# Patient Record
Sex: MALE | Race: BLACK OR AFRICAN AMERICAN | NOT HISPANIC OR LATINO | URBAN - METROPOLITAN AREA
[De-identification: names, ages, dates, MRNs, and addresses within clinical notes are randomized per-mention and may not be internally consistent; named-entity substitution may affect disease eponyms.]

---

## 2017-07-19 ENCOUNTER — EMERGENCY (EMERGENCY)
Facility: HOSPITAL | Age: 47
LOS: 1 days | Discharge: HOME | End: 2017-07-19
Attending: PERSONAL EMERGENCY RESPONSE ATTENDANT | Admitting: PERSONAL EMERGENCY RESPONSE ATTENDANT
Payer: COMMERCIAL

## 2017-07-19 VITALS
SYSTOLIC BLOOD PRESSURE: 96 MMHG | DIASTOLIC BLOOD PRESSURE: 59 MMHG | TEMPERATURE: 98 F | RESPIRATION RATE: 16 BRPM | OXYGEN SATURATION: 98 % | HEART RATE: 46 BPM

## 2017-07-19 LAB
ALBUMIN SERPL ELPH-MCNC: 4.1 G/DL — SIGNIFICANT CHANGE UP (ref 3.3–5)
ALP SERPL-CCNC: 49 U/L — SIGNIFICANT CHANGE UP (ref 40–120)
ALT FLD-CCNC: 17 U/L RC — SIGNIFICANT CHANGE UP (ref 10–45)
ANION GAP SERPL CALC-SCNC: 12 MMOL/L — SIGNIFICANT CHANGE UP (ref 5–17)
APTT BLD: 23.5 SEC — LOW (ref 27.5–37.4)
AST SERPL-CCNC: 23 U/L — SIGNIFICANT CHANGE UP (ref 10–40)
BASOPHILS # BLD AUTO: 0 K/UL — SIGNIFICANT CHANGE UP (ref 0–0.2)
BASOPHILS NFR BLD AUTO: 0.2 % — SIGNIFICANT CHANGE UP (ref 0–2)
BILIRUB SERPL-MCNC: 0.4 MG/DL — SIGNIFICANT CHANGE UP (ref 0.2–1.2)
BUN SERPL-MCNC: 19 MG/DL — SIGNIFICANT CHANGE UP (ref 7–23)
CALCIUM SERPL-MCNC: 9.4 MG/DL — SIGNIFICANT CHANGE UP (ref 8.4–10.5)
CHLORIDE SERPL-SCNC: 101 MMOL/L — SIGNIFICANT CHANGE UP (ref 96–108)
CO2 SERPL-SCNC: 27 MMOL/L — SIGNIFICANT CHANGE UP (ref 22–31)
CREAT SERPL-MCNC: 1.17 MG/DL — SIGNIFICANT CHANGE UP (ref 0.5–1.3)
EOSINOPHIL # BLD AUTO: 0.2 K/UL — SIGNIFICANT CHANGE UP (ref 0–0.5)
EOSINOPHIL NFR BLD AUTO: 1.8 % — SIGNIFICANT CHANGE UP (ref 0–6)
GAS PNL BLDV: SIGNIFICANT CHANGE UP
GLUCOSE SERPL-MCNC: 106 MG/DL — HIGH (ref 70–99)
HCT VFR BLD CALC: 44.2 % — SIGNIFICANT CHANGE UP (ref 39–50)
HGB BLD-MCNC: 14.4 G/DL — SIGNIFICANT CHANGE UP (ref 13–17)
HIV 1 & 2 AB SERPL IA.RAPID: SIGNIFICANT CHANGE UP
INR BLD: 1.14 RATIO — SIGNIFICANT CHANGE UP (ref 0.88–1.16)
LYMPHOCYTES # BLD AUTO: 1.8 K/UL — SIGNIFICANT CHANGE UP (ref 1–3.3)
LYMPHOCYTES # BLD AUTO: 14.8 % — SIGNIFICANT CHANGE UP (ref 13–44)
MCHC RBC-ENTMCNC: 28.2 PG — SIGNIFICANT CHANGE UP (ref 27–34)
MCHC RBC-ENTMCNC: 32.7 GM/DL — SIGNIFICANT CHANGE UP (ref 32–36)
MCV RBC AUTO: 86.3 FL — SIGNIFICANT CHANGE UP (ref 80–100)
MONOCYTES # BLD AUTO: 0.9 K/UL — SIGNIFICANT CHANGE UP (ref 0–0.9)
MONOCYTES NFR BLD AUTO: 7.5 % — SIGNIFICANT CHANGE UP (ref 2–14)
NEUTROPHILS # BLD AUTO: 9 K/UL — HIGH (ref 1.8–7.4)
NEUTROPHILS NFR BLD AUTO: 75.7 % — SIGNIFICANT CHANGE UP (ref 43–77)
PLATELET # BLD AUTO: 224 K/UL — SIGNIFICANT CHANGE UP (ref 150–400)
POTASSIUM SERPL-MCNC: 4.4 MMOL/L — SIGNIFICANT CHANGE UP (ref 3.5–5.3)
POTASSIUM SERPL-SCNC: 4.4 MMOL/L — SIGNIFICANT CHANGE UP (ref 3.5–5.3)
PROT SERPL-MCNC: 7.2 G/DL — SIGNIFICANT CHANGE UP (ref 6–8.3)
PROTHROM AB SERPL-ACNC: 12.3 SEC — SIGNIFICANT CHANGE UP (ref 9.8–12.7)
RBC # BLD: 5.12 M/UL — SIGNIFICANT CHANGE UP (ref 4.2–5.8)
RBC # FLD: 12.8 % — SIGNIFICANT CHANGE UP (ref 10.3–14.5)
SODIUM SERPL-SCNC: 140 MMOL/L — SIGNIFICANT CHANGE UP (ref 135–145)
WBC # BLD: 11.9 K/UL — HIGH (ref 3.8–10.5)
WBC # FLD AUTO: 11.9 K/UL — HIGH (ref 3.8–10.5)

## 2017-07-19 PROCEDURE — 99220: CPT | Mod: 25

## 2017-07-19 PROCEDURE — 93010 ELECTROCARDIOGRAM REPORT: CPT

## 2017-07-19 RX ORDER — DIPHENHYDRAMINE HCL 50 MG
50 CAPSULE ORAL ONCE
Qty: 0 | Refills: 0 | Status: DISCONTINUED | OUTPATIENT
Start: 2017-07-19 | End: 2017-07-23

## 2017-07-19 RX ORDER — SODIUM CHLORIDE 9 MG/ML
1000 INJECTION INTRAMUSCULAR; INTRAVENOUS; SUBCUTANEOUS ONCE
Qty: 0 | Refills: 0 | Status: COMPLETED | OUTPATIENT
Start: 2017-07-19 | End: 2017-07-19

## 2017-07-19 RX ORDER — DIPHENHYDRAMINE HCL 50 MG
50 CAPSULE ORAL EVERY 4 HOURS
Qty: 0 | Refills: 0 | Status: DISCONTINUED | OUTPATIENT
Start: 2017-07-19 | End: 2017-07-19

## 2017-07-19 RX ORDER — SODIUM CHLORIDE 9 MG/ML
3 INJECTION INTRAMUSCULAR; INTRAVENOUS; SUBCUTANEOUS EVERY 8 HOURS
Qty: 0 | Refills: 0 | Status: DISCONTINUED | OUTPATIENT
Start: 2017-07-19 | End: 2017-07-23

## 2017-07-19 RX ADMIN — SODIUM CHLORIDE 3 MILLILITER(S): 9 INJECTION INTRAMUSCULAR; INTRAVENOUS; SUBCUTANEOUS at 21:24

## 2017-07-19 RX ADMIN — SODIUM CHLORIDE 1000 MILLILITER(S): 9 INJECTION INTRAMUSCULAR; INTRAVENOUS; SUBCUTANEOUS at 18:14

## 2017-07-19 NOTE — ED PROVIDER NOTE - OBJECTIVE STATEMENT
48 yo m with no pmh presents after 2 syncopal episodes, lasting a few seconds each at work while inside. Patient reports having symptoms of sweatiness, nausea prior. Denies chest pain or shortness of breath.  He went to the toilet to try and defecate and passed out again on the toilet. Now he complains of generalized weakness. Denies other symptoms at present time.

## 2017-07-19 NOTE — ED ADULT NURSE NOTE - CHPI ED SYMPTOMS NEG
no blurred vision/no fever/no confusion/no nausea/no vomiting/no dizziness/no change in level of consciousness/no numbness

## 2017-07-19 NOTE — ED CDU PROVIDER NOTE - MEDICAL DECISION MAKING DETAILS
Attending MD Vo.  Agree with above.  PT is a 47 yr old male presenting to ED with complaint of  syncope while defecating.  He states that he was having a BM and became hot/sweaty and syncopized.  Pt came in by EMS.  Pt denies falls/head injuries.  Currently asymptomatic, ambulating around ED with stready gait.  No ACS risk factors. Attending MD Vo.  Agree with above.  PT is a 47 yr old male presenting to ED with complaint of transient near-syncope while walking out of an office then states that he was having a BM and became hot/sweaty and syncopized. Pt came in by EMS.  Pt denies falls/head injuries.  Currently asymptomatic, ambulating around ED with stready gait.  No ACS risk factors.  No hx of IVDU, no hx of cardiac events.  Pt endorses generalized weakness/malaise since yesterday.  Had thoracic back pain transiently yesterday and it resolved.  Pt endorses bilateral hand tingling.  Pt has no lesions/evidence of distal emboli.  Pt is currently afebrile.  Denies CP.  EKG with diffuse ST elevation and bradycardia.  No known exposures.  No cough/congestion.  PT is A & O x 3 in ED.  No reported SOB.  Pt endorses frequent exercise and HR may be normal for him.  Planned ACS eval, echo, trop, stress given recurrent episodes of syncope in setting of generalized malaise.

## 2017-07-19 NOTE — ED CDU PROVIDER NOTE - PROGRESS NOTE DETAILS
AR[employer]: Discussion with pt re: concern for pericarditis vs. endocarditis vs. myocarditis and desire to admit for provocative testing/echo.  Pt comfortable with this plan.  Pt noted now on CDU eval to have wheals c/w hives several over face and several over chest.  No medications have been dosed at this time.  Denies known allergies.  No n/v/clear breath sounds at this time.  Pt remains A & O x 3 in no acute distress.  No hx of cardiac work-up previously however pt endorses remote hx of syncopal event without exertion/CP but states that at that time he became diaphoretic and warm and syncopized.  Possible vagal however warrants full ACS eval.  Will dose benadryl for unclear etiology of apparent hives.  No routine meds, no focal to intertrigonal spaces, inconsistent with bed bugs/scabies.  No focus to areas of adhesive tape. CDU NOTE DWAYNE MARCELINO: Pt resting comfortably, feeling well without complaint. didn't want benadryl as rash is already resolving and pt is not itching. NAD, VSS. No events on telemetry. CDU NOTE DWAYNE MARCELINO: Pt resting comfortably, feeling well without complaint. NAD, VSS. No events on telemetry. rash completely resolved. Patient taken to stress lab. - Saritha Keys PA-C Patient with normal TTE results. Back from stress lab. Resting comfortably in CDU. NAD. No complaints. VSS. No tele events. Awaiting stress test results. - Saritha Keys PA-C Spoke with Dr. Crump in stress lab regarding pt's stress test results - Pt with excellent METS for age, and EKG changes that do not meet ischemic criteria. Discussed results with patient. Reports he plans to f/u with his PMD after discharge today. Pt stable for d/c. D/w Dr. Marin. - Saritha Keys, PA-C Patient with normal TTE results. Back from stress lab. Resting comfortably in CDU. NAD. No complaints. Denies CP, SOB, palpitations. VSS. No tele events. Awaiting stress test results. - Saritha Keys PA-C 47 yr old male presenting to ED with complaint of transient near-syncope.  No CP.  Per stress test results, do not meet criteria for ischemia; echo wnl; stable for dc.  On my assessment, patient is well appearing in NAD, cardiac: nrl s1/s2, lungs: ctabl, no peripheral edema, no jvd; abd soft nt/nd.  Stable for dc with outpatient f/u. -Tomas I have personally performed a face to face diagnostic evaluation on this patient.  I have reviewed the ACP note and agree with the history, exam, and plan of care, except as noted.  History and Exam by me shows: 47 yr old male presenting to ED with complaint of transient near-syncope.  No CP.  Per stress test results, do not meet criteria for ischemia; echo wnl; stable for dc.  On my assessment, patient is well appearing in NAD, cardiac: nrl s1/s2, lungs: ctabl, no peripheral edema, no jvd; abd soft nt/nd.  Stable for dc with outpatient f/u. -Tomas

## 2017-07-19 NOTE — ED ADULT NURSE REASSESSMENT NOTE - NS ED NURSE REASSESS COMMENT FT1
blood cultures drawn and sent as per MD orders  DWAYNE Bernstein from CDU at pts bedside evaluating pt for CDU acceptance  pt comfortable   plan of care explained

## 2017-07-19 NOTE — ED ADULT NURSE NOTE - ED STAT RN HANDOFF DETAILS
Report given to Cata PARK, to be transported to CDU on telemetry box as per request of CDU RN Report given to Cata RN, Pt on telemetry box, working appropriately

## 2017-07-19 NOTE — ED ADULT NURSE NOTE - OBJECTIVE STATEMENT
47 yr old male with no medical history present to the ER for  syncope. Pt reports that he was at work and was trying to pass stool when he passed out. As per pt he passed out twice. Pt also states he was also experiencing nausea, however denies vomiting. pt does not know how long he passed out for.

## 2017-07-19 NOTE — ED CDU PROVIDER NOTE - PLAN OF CARE
1. Drink plenty of fluids to stay hydrated.  2. Continue your home medications as directed.  3. You will need to follow-up with your PMD in the next 2-3 days for your (near) fainting spell. A copy of your results were given to you to bring to your appointment.  4. Return to ER for lightheaded/dizziness, chest pain, trouble breathing, palpitations, or any other concerns.

## 2017-07-19 NOTE — ED ADULT NURSE REASSESSMENT NOTE - NS ED NURSE REASSESS COMMENT FT1
Report given to Cata PARK in CDU, pt to be sent on telemetry box as per request of CDU RN Report given to Cata RN in CDU, pt sent with telemetry box on and working appropriately

## 2017-07-19 NOTE — ED PROVIDER NOTE - ATTENDING CONTRIBUTION TO CARE
Attending MD Vo.  Agree with above.  PT is a 47 yr old male presenting to ED with complaint of transient near-syncope while walking out of an office then states that he was having a BM and became hot/sweaty and syncopized. Pt came in by EMS.  Pt denies falls/head injuries.  Currently asymptomatic, ambulating around ED with stready gait.  No ACS risk factors.  No hx of IVDU, no hx of cardiac events.  Pt endorses generalized weakness/malaise since yesterday.  Had thoracic back pain transiently yesterday and it resolved.  Pt endorses bilateral hand tingling.  Pt has no lesions/evidence of distal emboli.  Pt is currently afebrile.  Denies CP.  EKG with diffuse ST elevation and bradycardia.  No known exposures.  No cough/congestion.  PT is A & O x 3 in ED.  No reported SOB.  Pt endorses frequent exercise and HR may be normal for him.  Planned ACS eval, echo, trop, stress given recurrent episodes of syncope in setting of generalized malaise.

## 2017-07-19 NOTE — ED ADULT NURSE REASSESSMENT NOTE - NS ED NURSE REASSESS COMMENT FT1
Report received from Daron Angel RN, resting upright in stretcher, red welts noted on bilat cheeks and abd. Pt states he just ate "pretzels and karen dune cookies," and that "the doctor knows, she was just here." Pt denied difficulty breathing, SOB/chest pain, difficulty swallowing, and cough, does c/o feeling itchy. Will continue to monitor.

## 2017-07-19 NOTE — ED ADULT NURSE REASSESSMENT NOTE - NS ED NURSE REASSESS COMMENT FT1
Received pt from XAVIER Rojas, received pt alert and responsive, oriented x4, denies any respiratory distress, SOB, or difficulty breathing. Pt transferred to CDU for observation for syncope, neuro intact, pt denies CP, SOB, diaphoresis, N/V, F/C, denies heart palpitations, visual changes. Pt c/o mild lightheadedness at this time.  Pt pending 2nd CE with EKG, stress test/ ECHO pending, tele monitoring SB on monitor HR: 50's.  IV in place, patent and free of signs of infiltration, V/S stable, pt afebrile, pt denies pain at this time. Pt educated on unit and unit rules, instructed patient to notify RN of any needed assistance, Pt verbalizes understanding, Call bell placed within reach. Safety maintained. Will continue to monitor. Received pt from XAVIER Rojas, received pt alert and responsive, oriented x4, denies any respiratory distress, SOB, or difficulty breathing. Pt transferred to CDU for observation for syncope, neuro intact, pt denies CP, SOB, diaphoresis, N/V, F/C, denies heart palpitations, visual changes. Pt c/o mild lightheadedness at this time.  Pt pending 2nd CE with EKG, stress test/ ECHO pending, tele monitoring SB on monitor HR: 50's. Mild rash noted to bilateral cheeks and chest area, pt denies any respiratory issues. Pt states redness is " going down".   IV in place, patent and free of signs of infiltration, V/S stable, pt afebrile, pt denies pain at this time. Pt educated on unit and unit rules, instructed patient to notify RN of any needed assistance, Pt verbalizes understanding, Call bell placed within reach. Safety maintained. Will continue to monitor.

## 2017-07-19 NOTE — ED CDU PROVIDER NOTE - DETAILS
Syncope  - frequent re-eval  - vitals q4hrs,  - tele, CE x 2 with EKG, TTE, stress test  - discussed with attending Dr. Vo

## 2017-07-19 NOTE — ED CDU PROVIDER NOTE - OBJECTIVE STATEMENT
46y/o M with no PMH presents after 2 syncopal episodes today lasting a few seconds each at work while inside. Patient reports having symptoms of nausea, sweats, weakness prior to 1st episode; states he was walking when it happened. The 2nd episode he went to the toilet to try and defecate and passed out again on the toilet. Neither episode was witnessed, so pt not sure how long he was out for but doesn't think it was long. Now he complains of generalized weakness. Denies other symptoms at present time. Denies chest pain or shortness of breath. Denied any confusion upon regaining consciousness, denies head injury, H/A, or any pain anywhere. Pt states he synopsized once before a couple years ago, states he was resting, then had nausea, sweats, and passed out. Noticed rash while talking to patient, patient denies having rash before. denies pruritis.

## 2017-07-20 VITALS
HEART RATE: 65 BPM | OXYGEN SATURATION: 99 % | SYSTOLIC BLOOD PRESSURE: 98 MMHG | TEMPERATURE: 98 F | RESPIRATION RATE: 18 BRPM | DIASTOLIC BLOOD PRESSURE: 50 MMHG

## 2017-07-20 LAB
CHOLEST SERPL-MCNC: 98 MG/DL — SIGNIFICANT CHANGE UP (ref 10–199)
HBA1C BLD-MCNC: 5.6 % — SIGNIFICANT CHANGE UP (ref 4–5.6)
HDLC SERPL-MCNC: 54 MG/DL — SIGNIFICANT CHANGE UP (ref 40–125)
LIPID PNL WITH DIRECT LDL SERPL: 34 MG/DL — SIGNIFICANT CHANGE UP
TOTAL CHOLESTEROL/HDL RATIO MEASUREMENT: 1.8 RATIO — LOW (ref 3.4–9.6)
TRIGL SERPL-MCNC: 49 MG/DL — SIGNIFICANT CHANGE UP (ref 10–149)
TROPONIN T SERPL-MCNC: <0.01 NG/ML — SIGNIFICANT CHANGE UP (ref 0–0.06)

## 2017-07-20 PROCEDURE — 84484 ASSAY OF TROPONIN QUANT: CPT

## 2017-07-20 PROCEDURE — 93016 CV STRESS TEST SUPVJ ONLY: CPT

## 2017-07-20 PROCEDURE — G0378: CPT

## 2017-07-20 PROCEDURE — 82947 ASSAY GLUCOSE BLOOD QUANT: CPT

## 2017-07-20 PROCEDURE — 82962 GLUCOSE BLOOD TEST: CPT

## 2017-07-20 PROCEDURE — 83605 ASSAY OF LACTIC ACID: CPT

## 2017-07-20 PROCEDURE — 80061 LIPID PANEL: CPT

## 2017-07-20 PROCEDURE — 82435 ASSAY OF BLOOD CHLORIDE: CPT

## 2017-07-20 PROCEDURE — 93017 CV STRESS TEST TRACING ONLY: CPT

## 2017-07-20 PROCEDURE — 99284 EMERGENCY DEPT VISIT MOD MDM: CPT | Mod: 25

## 2017-07-20 PROCEDURE — 82803 BLOOD GASES ANY COMBINATION: CPT

## 2017-07-20 PROCEDURE — 85730 THROMBOPLASTIN TIME PARTIAL: CPT

## 2017-07-20 PROCEDURE — 85610 PROTHROMBIN TIME: CPT

## 2017-07-20 PROCEDURE — 93010 ELECTROCARDIOGRAM REPORT: CPT | Mod: 76

## 2017-07-20 PROCEDURE — 85027 COMPLETE CBC AUTOMATED: CPT

## 2017-07-20 PROCEDURE — 82553 CREATINE MB FRACTION: CPT

## 2017-07-20 PROCEDURE — 84132 ASSAY OF SERUM POTASSIUM: CPT

## 2017-07-20 PROCEDURE — 86703 HIV-1/HIV-2 1 RESULT ANTBDY: CPT

## 2017-07-20 PROCEDURE — 82330 ASSAY OF CALCIUM: CPT

## 2017-07-20 PROCEDURE — 93306 TTE W/DOPPLER COMPLETE: CPT | Mod: 26

## 2017-07-20 PROCEDURE — 85014 HEMATOCRIT: CPT

## 2017-07-20 PROCEDURE — 82550 ASSAY OF CK (CPK): CPT

## 2017-07-20 PROCEDURE — 71010: CPT | Mod: 26

## 2017-07-20 PROCEDURE — 87040 BLOOD CULTURE FOR BACTERIA: CPT

## 2017-07-20 PROCEDURE — 93005 ELECTROCARDIOGRAM TRACING: CPT | Mod: XU

## 2017-07-20 PROCEDURE — 93306 TTE W/DOPPLER COMPLETE: CPT

## 2017-07-20 PROCEDURE — 93018 CV STRESS TEST I&R ONLY: CPT

## 2017-07-20 PROCEDURE — 80053 COMPREHEN METABOLIC PANEL: CPT

## 2017-07-20 PROCEDURE — 71045 X-RAY EXAM CHEST 1 VIEW: CPT

## 2017-07-20 PROCEDURE — 84295 ASSAY OF SERUM SODIUM: CPT

## 2017-07-20 PROCEDURE — 83036 HEMOGLOBIN GLYCOSYLATED A1C: CPT

## 2017-07-20 PROCEDURE — 99217: CPT | Mod: 25

## 2017-07-20 RX ADMIN — SODIUM CHLORIDE 3 MILLILITER(S): 9 INJECTION INTRAMUSCULAR; INTRAVENOUS; SUBCUTANEOUS at 05:26

## 2017-07-25 LAB
CULTURE RESULTS: SIGNIFICANT CHANGE UP
CULTURE RESULTS: SIGNIFICANT CHANGE UP
SPECIMEN SOURCE: SIGNIFICANT CHANGE UP
SPECIMEN SOURCE: SIGNIFICANT CHANGE UP

## 2019-12-17 NOTE — ED PROVIDER NOTE - GASTROINTESTINAL NEGATIVE STATEMENT, MLM
no abdominal pain, no bloating, no constipation, no diarrhea, no nausea and no vomiting.

## 2019-12-26 ENCOUNTER — INPATIENT (INPATIENT)
Facility: HOSPITAL | Age: 49
LOS: 1 days | Discharge: HOME | End: 2019-12-28
Attending: GENERAL ACUTE CARE HOSPITAL | Admitting: GENERAL ACUTE CARE HOSPITAL
Payer: COMMERCIAL

## 2019-12-26 VITALS
HEIGHT: 66 IN | OXYGEN SATURATION: 100 % | RESPIRATION RATE: 17 BRPM | TEMPERATURE: 98 F | HEART RATE: 56 BPM | DIASTOLIC BLOOD PRESSURE: 71 MMHG | SYSTOLIC BLOOD PRESSURE: 98 MMHG | WEIGHT: 145.06 LBS

## 2019-12-26 PROCEDURE — 99285 EMERGENCY DEPT VISIT HI MDM: CPT

## 2019-12-26 RX ORDER — KETOROLAC TROMETHAMINE 30 MG/ML
30 SYRINGE (ML) INJECTION ONCE
Refills: 0 | Status: DISCONTINUED | OUTPATIENT
Start: 2019-12-26 | End: 2019-12-26

## 2019-12-26 RX ORDER — SODIUM CHLORIDE 9 MG/ML
2000 INJECTION INTRAMUSCULAR; INTRAVENOUS; SUBCUTANEOUS ONCE
Refills: 0 | Status: COMPLETED | OUTPATIENT
Start: 2019-12-26 | End: 2019-12-26

## 2019-12-26 RX ORDER — ONDANSETRON 8 MG/1
4 TABLET, FILM COATED ORAL ONCE
Refills: 0 | Status: COMPLETED | OUTPATIENT
Start: 2019-12-26 | End: 2019-12-26

## 2019-12-26 RX ORDER — MORPHINE SULFATE 50 MG/1
4 CAPSULE, EXTENDED RELEASE ORAL ONCE
Refills: 0 | Status: DISCONTINUED | OUTPATIENT
Start: 2019-12-26 | End: 2019-12-26

## 2019-12-26 NOTE — ED PROVIDER NOTE - OBJECTIVE STATEMENT
Pertinent PMH/PSH/FHx/SHx and Review of Systems contained within:    50yo M w PMH of renal colic presents to ED for eval of L flank pain x1d.  +nausea, vomiting, fever, chills.  Pt states sx similar to previous episode of renal colic.  Denies CP, SOB, syncope.    +fever/chills, No photophobia/eye pain/changes in vision, No ear pain/sore throat/dysphagia, No chest pain/palpitations, no SOB/cough/wheeze/stridor, +abdominal pain, +back pain, no rash, no changes in neurological status/function.

## 2019-12-26 NOTE — ED PROVIDER NOTE - CARE PLAN
Principal Discharge DX:	UTI (urinary tract infection)  Secondary Diagnosis:	Renal insufficiency  Secondary Diagnosis:	Ureteral stone Principal Discharge DX:	UTI (urinary tract infection)  Secondary Diagnosis:	Renal insufficiency  Secondary Diagnosis:	Ureteral stone  Secondary Diagnosis:	Urinary obstruction

## 2019-12-26 NOTE — ED ADULT NURSE NOTE - ED STAT RN HANDOFF DETAILS
Report endorsed to hold RN Sunny. Safety checks compld this shift/Safety rounds completed hourly.  IV sites checked Q2+remains WDL. Meds given as ord with no s/s of adverse RXNs. Fall +skin precs in place. Any issues endorsed to oncoming RN for follow up. awaiting bed assignment. IVF infusing

## 2019-12-26 NOTE — ED ADULT NURSE NOTE - OBJECTIVE STATEMENT
pt received to bed E c/o right sided flank pain starting at 8PM. c/o n/v, fever, chills, decreased frequency of urination. denies: pain radiation, diarrhea, burning on urination, dysuria, chest pain, shortness of breath. pt appears to be guarding abdomen. no sick contacts

## 2019-12-26 NOTE — ED PROVIDER NOTE - PHYSICAL EXAMINATION
Gen: Alert, c/o pain  Head: NC, AT, EOMI, normal lids/conjunctiva  ENT: normal hearing, patent oropharynx, MMM  Neck: supple, no tenderness/meningismus, FROM Trachea midline  Pulm: Bilateral clear BS, normal resp effort, no wheeze/stridor/retractions  CV: RRR, no M/R/G, +dist pulses  Abd: soft, ND, +BS, no guarding/rebound tenderness, + L CVAT  Mskel: no edema/erythema/cyanosis  Skin: no rash  Neuro: no sensory/motor deficits

## 2019-12-27 DIAGNOSIS — N39.0 URINARY TRACT INFECTION, SITE NOT SPECIFIED: ICD-10-CM

## 2019-12-27 DIAGNOSIS — N13.9 OBSTRUCTIVE AND REFLUX UROPATHY, UNSPECIFIED: ICD-10-CM

## 2019-12-27 DIAGNOSIS — N20.0 CALCULUS OF KIDNEY: ICD-10-CM

## 2019-12-27 DIAGNOSIS — N28.9 DISORDER OF KIDNEY AND URETER, UNSPECIFIED: ICD-10-CM

## 2019-12-27 DIAGNOSIS — N20.1 CALCULUS OF URETER: ICD-10-CM

## 2019-12-27 LAB
ALBUMIN SERPL ELPH-MCNC: 2.9 G/DL — LOW (ref 3.3–5)
ALBUMIN SERPL ELPH-MCNC: 3.9 G/DL — SIGNIFICANT CHANGE UP (ref 3.3–5)
ALP SERPL-CCNC: 40 U/L — SIGNIFICANT CHANGE UP (ref 40–120)
ALP SERPL-CCNC: 49 U/L — SIGNIFICANT CHANGE UP (ref 40–120)
ALT FLD-CCNC: 19 U/L — SIGNIFICANT CHANGE UP (ref 12–78)
ALT FLD-CCNC: 27 U/L — SIGNIFICANT CHANGE UP (ref 12–78)
ANION GAP SERPL CALC-SCNC: 2 MMOL/L — LOW (ref 5–17)
ANION GAP SERPL CALC-SCNC: 8 MMOL/L — SIGNIFICANT CHANGE UP (ref 5–17)
APPEARANCE UR: CLEAR — SIGNIFICANT CHANGE UP
APTT BLD: 22.4 SEC — LOW (ref 28.5–37)
AST SERPL-CCNC: 19 U/L — SIGNIFICANT CHANGE UP (ref 15–37)
AST SERPL-CCNC: 26 U/L — SIGNIFICANT CHANGE UP (ref 15–37)
BACTERIA # UR AUTO: ABNORMAL
BASOPHILS # BLD AUTO: 0.03 K/UL — SIGNIFICANT CHANGE UP (ref 0–0.2)
BASOPHILS NFR BLD AUTO: 0.2 % — SIGNIFICANT CHANGE UP (ref 0–2)
BILIRUB SERPL-MCNC: 0.4 MG/DL — SIGNIFICANT CHANGE UP (ref 0.2–1.2)
BILIRUB SERPL-MCNC: 0.4 MG/DL — SIGNIFICANT CHANGE UP (ref 0.2–1.2)
BILIRUB UR-MCNC: NEGATIVE — SIGNIFICANT CHANGE UP
BUN SERPL-MCNC: 17 MG/DL — SIGNIFICANT CHANGE UP (ref 7–23)
BUN SERPL-MCNC: 25 MG/DL — HIGH (ref 7–23)
CALCIUM SERPL-MCNC: 7.3 MG/DL — LOW (ref 8.5–10.1)
CALCIUM SERPL-MCNC: 9.1 MG/DL — SIGNIFICANT CHANGE UP (ref 8.5–10.1)
CHLORIDE SERPL-SCNC: 105 MMOL/L — SIGNIFICANT CHANGE UP (ref 96–108)
CHLORIDE SERPL-SCNC: 114 MMOL/L — HIGH (ref 96–108)
CO2 SERPL-SCNC: 26 MMOL/L — SIGNIFICANT CHANGE UP (ref 22–31)
CO2 SERPL-SCNC: 28 MMOL/L — SIGNIFICANT CHANGE UP (ref 22–31)
COLOR SPEC: YELLOW — SIGNIFICANT CHANGE UP
CREAT SERPL-MCNC: 1.13 MG/DL — SIGNIFICANT CHANGE UP (ref 0.5–1.3)
CREAT SERPL-MCNC: 1.35 MG/DL — HIGH (ref 0.5–1.3)
DIFF PNL FLD: ABNORMAL
EOSINOPHIL # BLD AUTO: 0.02 K/UL — SIGNIFICANT CHANGE UP (ref 0–0.5)
EOSINOPHIL NFR BLD AUTO: 0.1 % — SIGNIFICANT CHANGE UP (ref 0–6)
GLUCOSE SERPL-MCNC: 134 MG/DL — HIGH (ref 70–99)
GLUCOSE SERPL-MCNC: 94 MG/DL — SIGNIFICANT CHANGE UP (ref 70–99)
GLUCOSE UR QL: NEGATIVE MG/DL — SIGNIFICANT CHANGE UP
HCT VFR BLD CALC: 35.6 % — LOW (ref 39–50)
HCT VFR BLD CALC: 41 % — SIGNIFICANT CHANGE UP (ref 39–50)
HGB BLD-MCNC: 11.4 G/DL — LOW (ref 13–17)
HGB BLD-MCNC: 13.2 G/DL — SIGNIFICANT CHANGE UP (ref 13–17)
IMM GRANULOCYTES NFR BLD AUTO: 0.4 % — SIGNIFICANT CHANGE UP (ref 0–1.5)
INR BLD: 1.2 RATIO — HIGH (ref 0.88–1.16)
KETONES UR-MCNC: ABNORMAL
LACTATE SERPL-SCNC: 1.8 MMOL/L — SIGNIFICANT CHANGE UP (ref 0.7–2)
LEUKOCYTE ESTERASE UR-ACNC: ABNORMAL
LYMPHOCYTES # BLD AUTO: 1.24 K/UL — SIGNIFICANT CHANGE UP (ref 1–3.3)
LYMPHOCYTES # BLD AUTO: 8.1 % — LOW (ref 13–44)
MCHC RBC-ENTMCNC: 25.9 PG — LOW (ref 27–34)
MCHC RBC-ENTMCNC: 26.3 PG — LOW (ref 27–34)
MCHC RBC-ENTMCNC: 32 GM/DL — SIGNIFICANT CHANGE UP (ref 32–36)
MCHC RBC-ENTMCNC: 32.2 GM/DL — SIGNIFICANT CHANGE UP (ref 32–36)
MCV RBC AUTO: 80.4 FL — SIGNIFICANT CHANGE UP (ref 80–100)
MCV RBC AUTO: 82 FL — SIGNIFICANT CHANGE UP (ref 80–100)
MONOCYTES # BLD AUTO: 0.97 K/UL — HIGH (ref 0–0.9)
MONOCYTES NFR BLD AUTO: 6.3 % — SIGNIFICANT CHANGE UP (ref 2–14)
NEUTROPHILS # BLD AUTO: 13 K/UL — HIGH (ref 1.8–7.4)
NEUTROPHILS NFR BLD AUTO: 84.9 % — HIGH (ref 43–77)
NITRITE UR-MCNC: POSITIVE
NRBC # BLD: 0 /100 WBCS — SIGNIFICANT CHANGE UP (ref 0–0)
NRBC # BLD: 0 /100 WBCS — SIGNIFICANT CHANGE UP (ref 0–0)
PH UR: 8 — SIGNIFICANT CHANGE UP (ref 5–8)
PLATELET # BLD AUTO: 184 K/UL — SIGNIFICANT CHANGE UP (ref 150–400)
PLATELET # BLD AUTO: 229 K/UL — SIGNIFICANT CHANGE UP (ref 150–400)
POTASSIUM SERPL-MCNC: 3.9 MMOL/L — SIGNIFICANT CHANGE UP (ref 3.5–5.3)
POTASSIUM SERPL-MCNC: 4.1 MMOL/L — SIGNIFICANT CHANGE UP (ref 3.5–5.3)
POTASSIUM SERPL-SCNC: 3.9 MMOL/L — SIGNIFICANT CHANGE UP (ref 3.5–5.3)
POTASSIUM SERPL-SCNC: 4.1 MMOL/L — SIGNIFICANT CHANGE UP (ref 3.5–5.3)
PROT SERPL-MCNC: 5.8 GM/DL — LOW (ref 6–8.3)
PROT SERPL-MCNC: 7.5 GM/DL — SIGNIFICANT CHANGE UP (ref 6–8.3)
PROT UR-MCNC: 100 MG/DL
PROTHROM AB SERPL-ACNC: 13.5 SEC — HIGH (ref 10–12.9)
RBC # BLD: 4.34 M/UL — SIGNIFICANT CHANGE UP (ref 4.2–5.8)
RBC # BLD: 5.1 M/UL — SIGNIFICANT CHANGE UP (ref 4.2–5.8)
RBC # FLD: 14.6 % — HIGH (ref 10.3–14.5)
RBC # FLD: 14.9 % — HIGH (ref 10.3–14.5)
RBC CASTS # UR COMP ASSIST: >50 /HPF (ref 0–4)
SODIUM SERPL-SCNC: 139 MMOL/L — SIGNIFICANT CHANGE UP (ref 135–145)
SODIUM SERPL-SCNC: 144 MMOL/L — SIGNIFICANT CHANGE UP (ref 135–145)
SP GR SPEC: 1.01 — SIGNIFICANT CHANGE UP (ref 1.01–1.02)
UROBILINOGEN FLD QL: NEGATIVE MG/DL — SIGNIFICANT CHANGE UP
WBC # BLD: 15.32 K/UL — HIGH (ref 3.8–10.5)
WBC # BLD: 8.77 K/UL — SIGNIFICANT CHANGE UP (ref 3.8–10.5)
WBC # FLD AUTO: 15.32 K/UL — HIGH (ref 3.8–10.5)
WBC # FLD AUTO: 8.77 K/UL — SIGNIFICANT CHANGE UP (ref 3.8–10.5)
WBC UR QL: SIGNIFICANT CHANGE UP

## 2019-12-27 PROCEDURE — 99222 1ST HOSP IP/OBS MODERATE 55: CPT

## 2019-12-27 PROCEDURE — 74176 CT ABD & PELVIS W/O CONTRAST: CPT | Mod: 26

## 2019-12-27 PROCEDURE — 12345: CPT | Mod: NC

## 2019-12-27 PROCEDURE — 93010 ELECTROCARDIOGRAM REPORT: CPT

## 2019-12-27 PROCEDURE — 71045 X-RAY EXAM CHEST 1 VIEW: CPT | Mod: 26

## 2019-12-27 RX ORDER — TAMSULOSIN HYDROCHLORIDE 0.4 MG/1
0.4 CAPSULE ORAL AT BEDTIME
Refills: 0 | Status: DISCONTINUED | OUTPATIENT
Start: 2019-12-27 | End: 2019-12-28

## 2019-12-27 RX ORDER — PIPERACILLIN AND TAZOBACTAM 4; .5 G/20ML; G/20ML
3.38 INJECTION, POWDER, LYOPHILIZED, FOR SOLUTION INTRAVENOUS ONCE
Refills: 0 | Status: COMPLETED | OUTPATIENT
Start: 2019-12-27 | End: 2019-12-27

## 2019-12-27 RX ORDER — SODIUM CHLORIDE 9 MG/ML
1000 INJECTION INTRAMUSCULAR; INTRAVENOUS; SUBCUTANEOUS ONCE
Refills: 0 | Status: COMPLETED | OUTPATIENT
Start: 2019-12-27 | End: 2019-12-27

## 2019-12-27 RX ORDER — MORPHINE SULFATE 50 MG/1
4 CAPSULE, EXTENDED RELEASE ORAL ONCE
Refills: 0 | Status: DISCONTINUED | OUTPATIENT
Start: 2019-12-27 | End: 2019-12-27

## 2019-12-27 RX ORDER — CEFTRIAXONE 500 MG/1
1000 INJECTION, POWDER, FOR SOLUTION INTRAMUSCULAR; INTRAVENOUS EVERY 24 HOURS
Refills: 0 | Status: DISCONTINUED | OUTPATIENT
Start: 2019-12-27 | End: 2019-12-28

## 2019-12-27 RX ORDER — SODIUM CHLORIDE 9 MG/ML
1000 INJECTION INTRAMUSCULAR; INTRAVENOUS; SUBCUTANEOUS
Refills: 0 | Status: DISCONTINUED | OUTPATIENT
Start: 2019-12-27 | End: 2019-12-28

## 2019-12-27 RX ORDER — CEFTRIAXONE 500 MG/1
1000 INJECTION, POWDER, FOR SOLUTION INTRAMUSCULAR; INTRAVENOUS ONCE
Refills: 0 | Status: COMPLETED | OUTPATIENT
Start: 2019-12-27 | End: 2019-12-27

## 2019-12-27 RX ADMIN — PIPERACILLIN AND TAZOBACTAM 200 GRAM(S): 4; .5 INJECTION, POWDER, LYOPHILIZED, FOR SOLUTION INTRAVENOUS at 03:50

## 2019-12-27 RX ADMIN — MORPHINE SULFATE 4 MILLIGRAM(S): 50 CAPSULE, EXTENDED RELEASE ORAL at 00:28

## 2019-12-27 RX ADMIN — CEFTRIAXONE 1000 MILLIGRAM(S): 500 INJECTION, POWDER, FOR SOLUTION INTRAMUSCULAR; INTRAVENOUS at 03:44

## 2019-12-27 RX ADMIN — Medication 30 MILLIGRAM(S): at 00:28

## 2019-12-27 RX ADMIN — CEFTRIAXONE 100 MILLIGRAM(S): 500 INJECTION, POWDER, FOR SOLUTION INTRAMUSCULAR; INTRAVENOUS at 03:14

## 2019-12-27 RX ADMIN — SODIUM CHLORIDE 100 MILLILITER(S): 9 INJECTION INTRAMUSCULAR; INTRAVENOUS; SUBCUTANEOUS at 06:14

## 2019-12-27 RX ADMIN — Medication 30 MILLIGRAM(S): at 00:43

## 2019-12-27 RX ADMIN — SODIUM CHLORIDE 1000 MILLILITER(S): 9 INJECTION INTRAMUSCULAR; INTRAVENOUS; SUBCUTANEOUS at 04:58

## 2019-12-27 RX ADMIN — SODIUM CHLORIDE 2000 MILLILITER(S): 9 INJECTION INTRAMUSCULAR; INTRAVENOUS; SUBCUTANEOUS at 03:15

## 2019-12-27 RX ADMIN — SODIUM CHLORIDE 2000 MILLILITER(S): 9 INJECTION INTRAMUSCULAR; INTRAVENOUS; SUBCUTANEOUS at 00:28

## 2019-12-27 RX ADMIN — MORPHINE SULFATE 4 MILLIGRAM(S): 50 CAPSULE, EXTENDED RELEASE ORAL at 00:43

## 2019-12-27 RX ADMIN — CEFTRIAXONE 100 MILLIGRAM(S): 500 INJECTION, POWDER, FOR SOLUTION INTRAMUSCULAR; INTRAVENOUS at 09:45

## 2019-12-27 RX ADMIN — SODIUM CHLORIDE 1000 MILLILITER(S): 9 INJECTION INTRAMUSCULAR; INTRAVENOUS; SUBCUTANEOUS at 03:51

## 2019-12-27 RX ADMIN — SODIUM CHLORIDE 100 MILLILITER(S): 9 INJECTION INTRAMUSCULAR; INTRAVENOUS; SUBCUTANEOUS at 21:10

## 2019-12-27 RX ADMIN — TAMSULOSIN HYDROCHLORIDE 0.4 MILLIGRAM(S): 0.4 CAPSULE ORAL at 21:10

## 2019-12-27 RX ADMIN — MORPHINE SULFATE 4 MILLIGRAM(S): 50 CAPSULE, EXTENDED RELEASE ORAL at 21:25

## 2019-12-27 RX ADMIN — MORPHINE SULFATE 4 MILLIGRAM(S): 50 CAPSULE, EXTENDED RELEASE ORAL at 21:40

## 2019-12-27 RX ADMIN — ONDANSETRON 4 MILLIGRAM(S): 8 TABLET, FILM COATED ORAL at 00:28

## 2019-12-27 NOTE — H&P ADULT - NSHPLABSRESULTS_GEN_ALL_CORE
LABS:                        13.2   15.32 )-----------( 229      ( 27 Dec 2019 00:34 )             41.0         139  |  105  |  25<H>  ----------------------------<  134<H>  3.9   |  26  |  1.35<H>    Ca    9.1      27 Dec 2019 00:34    TPro  7.5  /  Alb  3.9  /  TBili  0.4  /  DBili  x   /  AST  26  /  ALT  27  /  AlkPhos  49      PT/INR - ( 27 Dec 2019 00:34 )   PT: 13.5 sec;   INR: 1.20 ratio         PTT - ( 27 Dec 2019 00:34 )  PTT:22.4 sec    CAPILLARY BLOOD GLUCOSE    LIVER FUNCTIONS - ( 27 Dec 2019 00:34 )  Alb: 3.9 g/dL / Pro: 7.5 gm/dL / ALK PHOS: 49 U/L / ALT: 27 U/L / AST: 26 U/L / GGT: x           Urinalysis Basic - ( 27 Dec 2019 03:17 )    Color: Yellow / Appearance: Clear / S.010 / pH: x  Gluc: x / Ketone: Trace  / Bili: Negative / Urobili: Negative mg/dL   Blood: x / Protein: 100 mg/dL / Nitrite: Positive   Leuk Esterase: Moderate / RBC: >50 /HPF / WBC 3-5   Sq Epi: x / Non Sq Epi: x / Bacteria: Many    < from: CT Renal Stone Hunt (19 @ 01:40) >    IMPRESSION:     -4 mm right ureterovesical junction calculus with mild right hydroureteronephrosis. Bilateral nephrolithiasis.    -Prominence of the seminal vesicles with surrounding fat stranding. Correlate with clinical examination for possibility of seminal vesiculitis.      < end of copied text >      EKG: [id number]@69bpm

## 2019-12-27 NOTE — H&P ADULT - HISTORY OF PRESENT ILLNESS
49 YOM with a PMHx of kidney stones c/o R flank pain x1d. Admits to history of kidney stones, states he has followed up with urologist in past to break up his stones. Admits to nausea and vomiting today. Denies urinary frequency, urgency, dysuria. Otherwise denies cp, sob, palpitations, headache, blurry vision, fevers, chills. +nausea, vomiting, fever, chills.      In ED labs with WBC: 15.32. Elev BUN/Cr: 25/1.35. Positive UA. CT renal with 4mm right uretral stone.

## 2019-12-27 NOTE — CONSULT NOTE ADULT - SUBJECTIVE AND OBJECTIVE BOX
HPI: 50yo M         PAST MEDICAL & SURGICAL HISTORY:  Kidney stones  No pertinent past medical history  No significant past surgical history  Allergies    No Known Allergies    Intolerances      12-27    144  |  114<H>  |  17  ----------------------------<  94  4.1   |  28  |  1.13    Ca    7.3<L>      27 Dec 2019 08:33    TPro  5.8<L>  /  Alb  2.9<L>  /  TBili  0.4  /  DBili  x   /  AST  19  /  ALT  19  /  AlkPhos  40  12-27                        11.4   8.77  )-----------( 184      ( 27 Dec 2019 08:33 )             35.6 HPI: 50yo M w a history of nephrolithiasis s/p ESWL 2 over the last 10 years. Now with recurrent 2 days of right sided renal colic. + nausea/vomiting at home, ? fever. CT consistent with distal 5mm uvj calculus (and non-obstructing calculi bilat), mild-mod right hydronephrosis. +Leuckocytosis which has not cleared. No current fever.  No tolerating liquids. Pain resolved for ~15 hours at this time.        PAST MEDICAL & SURGICAL HISTORY:  Kidney stones  No pertinent past medical history  No significant past surgical history  Allergies    No Known Allergies    Intolerances      12-27    144  |  114<H>  |  17  ----------------------------<  94  4.1   |  28  |  1.13    Ca    7.3<L>      27 Dec 2019 08:33    TPro  5.8<L>  /  Alb  2.9<L>  /  TBili  0.4  /  DBili  x   /  AST  19  /  ALT  19  /  AlkPhos  40  12-27                        11.4   8.77  )-----------( 184      ( 27 Dec 2019 08:33 )             35.6   EXAM: CT RENAL STONE BAZAN       PROCEDURE DATE: 12/27/2019         INTERPRETATION: CLINICAL INFORMATION: Left flank pain. Vomiting.     COMPARISON: None available.     TECHNIQUE: Noncontrast CT of the abdomen and pelvis was performed with   coronal and sagittal reformats. No oral contrast.     FINDINGS:   Assessment of solid organs and viscera is limited without intravenous   contrast enhancement.     LOWER CHEST: Within normal limits.     HEPATOBILIARY: Hypodense hepatic lesions too small to characterize. No   biliary ductal dilatation. Gallbladder within normal limits.   PANCREAS: Within normal limits.   SPLEEN: Within normal limits.   ADRENALS: Within normal limits.     KIDNEYS/URETERS/BLADDER: 4 mm right ureterovesical junction calculus with   mild right hydroureteronephrosis. Nonobstructing right renal calculi   measuring 5 mm and 3 mm. Nonobstructing left renal calculus measuring 7 x 4   x 7 mm. No left hydronephrosis. Bladder within normal limits.   REPRODUCTIVE ORGANS: Prominence of the seminal vesicles with surrounding fat   stranding. Normal size prostate gland.     BOWEL/PERITONEUM: No bowel obstruction, inflammation or pneumoperitoneum.   Normal appendix. Portions of the gastrointestinal tract are collapsed,   limiting evaluation.     VESSELS: Within normal limits.   LYMPHATICS/RETROPERITONEUM: No lymphadenopathy. No retroperitoneal hematoma.       SOFT TISSUES: Tiny fat-containing umbilical hernia.   BONES: Within normal limits.     IMPRESSION:     -4 mm right ureterovesical junction calculus with mild right   hydroureteronephrosis. Bilateral nephrolithiasis.     -Prominence of the seminal vesicles with surrounding fat stranding.   Correlate with clinical examination for possibility of seminal vesiculitis.

## 2019-12-27 NOTE — H&P ADULT - PROBLEM SELECTOR PLAN 1
- Admit to med/surg  - Continue Rocephin  - IVF @75ml/hr  - Urology consult   - NPO for possible procedure  - Pain management  - F/u am labs  - Continue to monitor - Admit to telemetry  - Continue Rocephin  - IVF @75ml/hr  - Urology consult   - NPO for possible procedure  - Pain management  - F/u am labs  - Continue to monitor

## 2019-12-27 NOTE — CHART NOTE - NSCHARTNOTEFT_GEN_A_CORE
49 YOM with a PMHx of kidney stones admitted with UTI and urethral stone.   Patient seen and examined bedside   currently asymptomatic with no complaints.   denies flank pain, N/V    GENERAL: NAD well-developed  HEAD:  Atraumatic, Normocephalic  EYES: EOMI, PERRLA, conjunctiva and sclera clear  ENMT: No tonsillar erythema, exudates, or enlargement; Moist mucous membranes  NECK: Supple, No JVD  NERVOUS SYSTEM:  Alert & Oriented X3, Good concentration; moving all extremities   CHEST/LUNG: CTAB  HEART: Regular rate and rhythm; No murmurs, rubs, or gallops  ABDOMEN: Soft, Nontender, Nondistended; Bowel sounds present  EXTREMITIES:  2+ Peripheral Pulses b/l, No clubbing, cyanosis, or edema b/l     Labs and imaging reviewed     < from: CT Renal Stone Hunt (12.27.19 @ 01:40) >      IMPRESSION:     -4 mm right ureterovesical junction calculus with mild right hydroureteronephrosis. Bilateral nephrolithiasis.    -Prominence of the seminal vesicles with surrounding fat stranding. Correlate with clinical examination for possibility of seminal vesiculitis.    < end of copied text >    < from: Xray Chest 1 View- PORTABLE-Urgent (12.27.19 @ 03:06) >    IMPRESSION:  No active parenchymal disease in the chest.    < end of copied text >    EKG: NSR    Assessment and Plan:   4mm Rt,  ureterovesical junction calculus with mild right hydroureteronephrosis  B/L nephrolithiasis  possible acute cystitis     -Urology consult  -IVF, abx   -NPO for possible procedure  -follow Urine Cx    -the rest of management per H&p

## 2019-12-27 NOTE — H&P ADULT - NSHPPHYSICALEXAM_GEN_ALL_CORE
Physical Exam:   GENERAL: NAD, well-groomed, well-developed  HEAD:  Atraumatic, Normocephalic  EYES: EOMI, PERRLA, conjunctiva and sclera clear  ENMT: No tonsillar erythema, exudates, or enlargement; Moist mucous membranes, No lesions  NECK: Supple, No JVD  NERVOUS SYSTEM:  Alert & Oriented X3, Good concentration; Motor Strength 5/5 B/L upper and lower extremities, No arm/leg drift, good finger grasp  CHEST/LUNG: Clear to percussion bilaterally; No rales, rhonchi, wheezing, or rubs  HEART: Regular rate and rhythm; No murmurs  ABDOMEN: +R flank tenderness. Soft, Nontender, Nondistended; Bowel sounds present  EXTREMITIES:  2+ Peripheral Pulses, No clubbing, cyanosis, or edema  LYMPH: No lymphadenopathy noted  SKIN: No rashes or lesions

## 2019-12-27 NOTE — H&P ADULT - NSHPREVIEWOFSYSTEMS_GEN_ALL_CORE
REVIEW OF SYSTEMS:  CONSTITUTIONAL: No fever, weight loss, or fatigue	  EYES: No eye pain, visual disturbances, or discharge  ENMT:  No difficulty hearing, tinnitus, vertigo; No sinus or throat pain  NECK: No pain or stiffness  BREASTS: No pain, masses, or nipple discharge  RESPIRATORY: No cough, wheezing, chills or hemoptysis; No shortness of breath  CARDIOVASCULAR: No chest pain, palpitations, dizziness, or leg swelling  GASTROINTESTINAL: +nausea, +vomiting, +Right flank pain. No abdominal or epigastric pain. No diarrhea or constipation. No melena or hematochezia.  GENITOURINARY: No dysuria, No frequency, No hematuria, No incontinence  NEUROLOGICAL: No headaches, memory loss, loss of strength, numbness, or tremors  SKIN: No itching, burning, rashes, or lesions   LYMPH NODES: No enlarged glands  ENDOCRINE: No heat or cold intolerance; No hair loss  MUSCULOSKELETAL: No joint pain or swelling; No muscle, back, or extremity pain  PSYCHIATRIC: No depression, anxiety, mood swings, or difficulty sleeping  HEME/LYMPH: No easy bruising, or bleeding gums  ALLERGY AND IMMUNOLOGIC: No hives or eczema

## 2019-12-27 NOTE — CONSULT NOTE ADULT - ASSESSMENT
- Continue abx  - f/u urine cultures/sensitivities   - Tamsulosin   - Trial of passage yo M w/ right renal colic, hydronephrosis with marked improvement on IV fluids abx. Distal right calculus descent chance of passing  - Continue abx  - f/u urine cultures/sensitivities   - Tamsulosin   - Trial of passage

## 2019-12-27 NOTE — H&P ADULT - ASSESSMENT
49 YOM with a PMHx of kidney stones admitted with UTI and urethral stone.           RISK                                                          Points  [  ] Previous VTE                                                3  [  ] Thrombophilia                                             2  [  ] Lower limb paralysis                                   2        (unable to hold up >15 seconds)    [  ] Current Cancer                                             2         (within 6 months)  [  ] Immobilization > 24 hrs                              1  [  ] ICU/CCU stay > 24 hours                             1  [  ] Age > 60                                                         1    IMPROVE VTE Score: 0

## 2019-12-28 VITALS
TEMPERATURE: 99 F | DIASTOLIC BLOOD PRESSURE: 60 MMHG | OXYGEN SATURATION: 98 % | HEART RATE: 68 BPM | RESPIRATION RATE: 16 BRPM | SYSTOLIC BLOOD PRESSURE: 119 MMHG

## 2019-12-28 LAB
ALBUMIN SERPL ELPH-MCNC: 2.9 G/DL — LOW (ref 3.3–5)
ALP SERPL-CCNC: 44 U/L — SIGNIFICANT CHANGE UP (ref 40–120)
ALT FLD-CCNC: 23 U/L — SIGNIFICANT CHANGE UP (ref 12–78)
ANION GAP SERPL CALC-SCNC: 8 MMOL/L — SIGNIFICANT CHANGE UP (ref 5–17)
AST SERPL-CCNC: 23 U/L — SIGNIFICANT CHANGE UP (ref 15–37)
BILIRUB SERPL-MCNC: 0.7 MG/DL — SIGNIFICANT CHANGE UP (ref 0.2–1.2)
BLD GP AB SCN SERPL QL: SIGNIFICANT CHANGE UP
BUN SERPL-MCNC: 13 MG/DL — SIGNIFICANT CHANGE UP (ref 7–23)
CALCIUM SERPL-MCNC: 8.2 MG/DL — LOW (ref 8.5–10.1)
CHLORIDE SERPL-SCNC: 109 MMOL/L — HIGH (ref 96–108)
CO2 SERPL-SCNC: 23 MMOL/L — SIGNIFICANT CHANGE UP (ref 22–31)
CREAT SERPL-MCNC: 1.26 MG/DL — SIGNIFICANT CHANGE UP (ref 0.5–1.3)
CULTURE RESULTS: NO GROWTH — SIGNIFICANT CHANGE UP
GLUCOSE SERPL-MCNC: 101 MG/DL — HIGH (ref 70–99)
HCT VFR BLD CALC: 37.9 % — LOW (ref 39–50)
HGB BLD-MCNC: 12.2 G/DL — LOW (ref 13–17)
MAGNESIUM SERPL-MCNC: 1.7 MG/DL — SIGNIFICANT CHANGE UP (ref 1.6–2.6)
MCHC RBC-ENTMCNC: 26.3 PG — LOW (ref 27–34)
MCHC RBC-ENTMCNC: 32.2 GM/DL — SIGNIFICANT CHANGE UP (ref 32–36)
MCV RBC AUTO: 81.7 FL — SIGNIFICANT CHANGE UP (ref 80–100)
NRBC # BLD: 0 /100 WBCS — SIGNIFICANT CHANGE UP (ref 0–0)
PHOSPHATE SERPL-MCNC: 2 MG/DL — LOW (ref 2.5–4.5)
PLATELET # BLD AUTO: 191 K/UL — SIGNIFICANT CHANGE UP (ref 150–400)
POTASSIUM SERPL-MCNC: 4.1 MMOL/L — SIGNIFICANT CHANGE UP (ref 3.5–5.3)
POTASSIUM SERPL-SCNC: 4.1 MMOL/L — SIGNIFICANT CHANGE UP (ref 3.5–5.3)
PROT SERPL-MCNC: 6 GM/DL — SIGNIFICANT CHANGE UP (ref 6–8.3)
RBC # BLD: 4.64 M/UL — SIGNIFICANT CHANGE UP (ref 4.2–5.8)
RBC # FLD: 15.2 % — HIGH (ref 10.3–14.5)
SODIUM SERPL-SCNC: 140 MMOL/L — SIGNIFICANT CHANGE UP (ref 135–145)
SPECIMEN SOURCE: SIGNIFICANT CHANGE UP
WBC # BLD: 10.32 K/UL — SIGNIFICANT CHANGE UP (ref 3.8–10.5)
WBC # FLD AUTO: 10.32 K/UL — SIGNIFICANT CHANGE UP (ref 3.8–10.5)

## 2019-12-28 PROCEDURE — 99239 HOSP IP/OBS DSCHRG MGMT >30: CPT

## 2019-12-28 PROCEDURE — 76775 US EXAM ABDO BACK WALL LIM: CPT | Mod: 26

## 2019-12-28 RX ORDER — TAMSULOSIN HYDROCHLORIDE 0.4 MG/1
1 CAPSULE ORAL
Qty: 30 | Refills: 0
Start: 2019-12-28

## 2019-12-28 RX ORDER — CEFDINIR 250 MG/5ML
1 POWDER, FOR SUSPENSION ORAL
Qty: 10 | Refills: 0
Start: 2019-12-28 | End: 2020-01-01

## 2019-12-28 RX ADMIN — Medication 62.5 MILLIMOLE(S): at 13:50

## 2019-12-28 RX ADMIN — CEFTRIAXONE 100 MILLIGRAM(S): 500 INJECTION, POWDER, FOR SOLUTION INTRAMUSCULAR; INTRAVENOUS at 09:42

## 2019-12-28 NOTE — DISCHARGE NOTE PROVIDER - NSDCCPCAREPLAN_GEN_ALL_CORE_FT
PRINCIPAL DISCHARGE DIAGNOSIS  Diagnosis: Ureteral stone  Assessment and Plan of Treatment:       SECONDARY DISCHARGE DIAGNOSES  Diagnosis: Kidney stones  Assessment and Plan of Treatment: Kidney stones    Diagnosis: Urinary obstruction  Assessment and Plan of Treatment:

## 2019-12-28 NOTE — DISCHARGE NOTE PROVIDER - NSDCMRMEDTOKEN_GEN_ALL_CORE_FT
cefdinir 300 mg oral capsule: 1 cap(s) orally 2 times a day   tamsulosin 0.4 mg oral capsule: 1 cap(s) orally once a day (at bedtime)

## 2019-12-28 NOTE — DISCHARGE NOTE NURSING/CASE MANAGEMENT/SOCIAL WORK - NSPROEXTENSIONSOFSELF_GEN_A_NUR
Mildly to Moderately Impaired: difficulty hearing in some environments or speaker may need to increase volume or speak distinctly/impaired none

## 2019-12-28 NOTE — DISCHARGE NOTE PROVIDER - CARE PROVIDER_API CALL
Fernando Dupont)  Urology  [address] [place] [address]  Phone: [phone]  Fax: [phone]  Follow Up Time: 1 week

## 2019-12-28 NOTE — PROGRESS NOTE ADULT - SUBJECTIVE AND OBJECTIVE BOX
Patient seen and examined bedside resting comfortably.  No complaints offered, states he had some left flank pain last night, has not taken any pain medications since then. Denies N/V or abdominal pain.  Voiding well without difficulty. Has not passed any stone.   Denies fever/chills, chest pain, sob.     T(F): 98.7 (12-28-19 @ 11:20), Max: 99.9 (12-28-19 @ 05:04)  HR: 68 (12-28-19 @ 11:20) (63 - 80)  BP: 113/61 (12-28-19 @ 11:20) (105/62 - 130/67)  RR: 17 (12-28-19 @ 11:20) (16 - 18)  SpO2: 99% (12-28-19 @ 11:20) (96% - 99%)    PHYSICAL EXAM:    General: NAD, alert and awake  Chest: nonlabored respirations, CTA b/l.  Abdomen: soft, NT/ND.   Extremities: Calf soft, nontender b/l.   : No suprapubic tenderness or bladder distention.  No CVA tenderness b/l    LABS:                        12.2   10.32 )-----------( 191      ( 28 Dec 2019 07:21 )             37.9   12-28    140  |  109<H>  |  13  ----------------------------<  101<H>  4.1   |  23  |  1.26    Ca    8.2<L>      28 Dec 2019 07:21  Phos  2.0     12-28  Mg     1.7     12-28    TPro  6.0  /  Alb  2.9<L>  /  TBili  0.7  /  DBili  x   /  AST  23  /  ALT  23  /  AlkPhos  44  12-28  PT/INR - ( 27 Dec 2019 00:34 )   PT: 13.5 sec;   INR: 1.20 ratio         PTT - ( 27 Dec 2019 00:34 )  PTT:22.4 sec  I&O's Detail      A/P: 50yo M w/ right renal colic, hydronephrosis with marked improvement on IV fluids abx. Distal right calculus with mild right hydro seen on f/u Renal US however patient clinically improving. Afebrile.     as per Dr. Dupont, patient may be discharged to follow up with him in the office next Friday for possible outpatient ureteroscopy, stone removal   patient understands and agrees to plan, would like to be discharged and will f/u in office.  discussed with Hospitalist Patient seen and examined bedside resting comfortably.  No complaints offered, states he had some left flank pain last night, has not taken any pain medications since then. Denies N/V or abdominal pain.  Voiding well without difficulty. Has not passed any stone.   Denies fever/chills, chest pain, sob.     T(F): 98.7 (12-28-19 @ 11:20), Max: 99.9 (12-28-19 @ 05:04)  HR: 68 (12-28-19 @ 11:20) (63 - 80)  BP: 113/61 (12-28-19 @ 11:20) (105/62 - 130/67)  RR: 17 (12-28-19 @ 11:20) (16 - 18)  SpO2: 99% (12-28-19 @ 11:20) (96% - 99%)    PHYSICAL EXAM:    General: NAD, alert and awake  Chest: nonlabored respirations, CTA b/l.  Abdomen: soft, NT/ND.   Extremities: Calf soft, nontender b/l.   : No suprapubic tenderness or bladder distention.  No CVA tenderness b/l    LABS:                        12.2   10.32 )-----------( 191      ( 28 Dec 2019 07:21 )             37.9   12-28    140  |  109<H>  |  13  ----------------------------<  101<H>  4.1   |  23  |  1.26    Ca    8.2<L>      28 Dec 2019 07:21  Phos  2.0     12-28  Mg     1.7     12-28    TPro  6.0  /  Alb  2.9<L>  /  TBili  0.7  /  DBili  x   /  AST  23  /  ALT  23  /  AlkPhos  44  12-28  PT/INR - ( 27 Dec 2019 00:34 )   PT: 13.5 sec;   INR: 1.20 ratio         PTT - ( 27 Dec 2019 00:34 )  PTT:22.4 sec  I&O's Detail      A/P: 48yo M w/ right renal colic, hydronephrosis with marked improvement on IV fluids abx. Distal right calculus with mild right hydro seen on f/u Renal US however patient clinically improving. Afebrile.     as per Dr. Dupont, patient may be discharged with po abx x 5 days to follow up with him in the office next Friday for possible outpatient ureteroscopy, stone removal   patient understands and agrees to plan, would like to be discharged and will f/u in office.  discussed with Hospitalist

## 2019-12-28 NOTE — DISCHARGE NOTE NURSING/CASE MANAGEMENT/SOCIAL WORK - PATIENT PORTAL LINK FT
You can access the FollowMyHealth Patient Portal offered by [facility] by registering at the following website: [email or web address]. By joining [employer]’s FollowMyHealth portal, you will also be able to view your health information using other applications (apps) compatible with our system.

## 2019-12-28 NOTE — DISCHARGE NOTE PROVIDER - HOSPITAL COURSE
50yo M w/ right renal colic, hydronephrosis with marked improvement on IV fluids abx. Distal right calculus with mild right hydro seen on f/u Renal US however patient clinically improving. Afebrile.         as per Dr. Dupont, patient may be discharged with po abx x 5 days to follow up with him in the office next Friday for possible outpatient ureteroscopy, stone removal     patient understands and agrees to plan, would like to be discharged and will f/u in office. 50yo M w/ right renal colic, hydronephrosis with marked improvement on IV fluids abx. Distal right calculus with mild right hydro seen on f/u Renal US however patient clinically improving. Afebrile. Leukocytosis resolved.         as per Dr. Dupont, patient may be discharged with po abx x 5 days to follow up with him in the office next Friday 1/3/19  for possible outpatient ureteroscopy, stone removal     patient understands and agrees to plan, would like to be discharged and will f/u in office.        RETURN PARAMETERS DISCUSSED WITH PATIENT, PATIENT EXPRESSED UNDERSTANDING AND IS AGREEABLE.        Discharge time : 40 min 48yo M w/ right renal colic, hydronephrosis with marked improvement on IV fluids abx. Distal right calculus with mild right hydro seen on f/u Renal US however patient clinically improving. Afebrile. Leukocytosis resolved.         as per Dr. Dupont, patient may be discharged with po abx x 5 days to follow up with him in the office next Friday 1/3/19  for possible outpatient ureteroscopy, stone removal     patient understands and agrees to plan, would like to be discharged and will f/u in office.            Hypophosphatemia --repleted         RETURN PARAMETERS DISCUSSED WITH PATIENT, PATIENT EXPRESSED UNDERSTANDING AND IS AGREEABLE.        Discharge time : 40 min

## 2019-12-30 DIAGNOSIS — N20.0 CALCULUS OF KIDNEY: ICD-10-CM

## 2019-12-30 DIAGNOSIS — R11.2 NAUSEA WITH VOMITING, UNSPECIFIED: ICD-10-CM

## 2019-12-30 DIAGNOSIS — N13.2 HYDRONEPHROSIS WITH RENAL AND URETERAL CALCULOUS OBSTRUCTION: ICD-10-CM

## 2019-12-30 DIAGNOSIS — R10.9 UNSPECIFIED ABDOMINAL PAIN: ICD-10-CM

## 2022-09-09 NOTE — ED PROVIDER NOTE - ENMT NEGATIVE STATEMENT, MLM
Ears: no ear pain and no hearing problems.Nose: no nasal congestion and no nasal drainage.Mouth/Throat: no dysphagia, no hoarseness and no throat pain.Neck: no lumps, no pain, no stiffness and no swollen glands. Qbrexza Counseling:  I discussed with the patient the risks of Qbrexza including but not limited to headache, mydriasis, blurred vision, dry eyes, nasal dryness, dry mouth, dry throat, dry skin, urinary hesitation, and constipation.  Local skin reactions including erythema, burning, stinging, and itching can also occur.

## 2022-09-15 ENCOUNTER — EMERGENCY (EMERGENCY)
Facility: HOSPITAL | Age: 52
LOS: 0 days | Discharge: HOME | End: 2022-09-15
Attending: EMERGENCY MEDICINE

## 2022-09-15 VITALS
TEMPERATURE: 98 F | OXYGEN SATURATION: 98 % | RESPIRATION RATE: 18 BRPM | HEART RATE: 63 BPM | HEIGHT: 66.5 IN | DIASTOLIC BLOOD PRESSURE: 63 MMHG | SYSTOLIC BLOOD PRESSURE: 108 MMHG | WEIGHT: 145.95 LBS

## 2022-09-15 VITALS — OXYGEN SATURATION: 99 % | HEART RATE: 66 BPM | TEMPERATURE: 98 F | RESPIRATION RATE: 18 BRPM

## 2022-09-15 DIAGNOSIS — R55 SYNCOPE AND COLLAPSE: ICD-10-CM

## 2022-09-15 DIAGNOSIS — Z87.891 PERSONAL HISTORY OF NICOTINE DEPENDENCE: ICD-10-CM

## 2022-09-15 DIAGNOSIS — Z87.442 PERSONAL HISTORY OF URINARY CALCULI: ICD-10-CM

## 2022-09-15 LAB
ALBUMIN SERPL ELPH-MCNC: 3.7 G/DL — SIGNIFICANT CHANGE UP (ref 3.3–5)
ALP SERPL-CCNC: 47 U/L — SIGNIFICANT CHANGE UP (ref 40–120)
ALT FLD-CCNC: 20 U/L — SIGNIFICANT CHANGE UP (ref 12–78)
ANION GAP SERPL CALC-SCNC: 2 MMOL/L — LOW (ref 5–17)
AST SERPL-CCNC: 19 U/L — SIGNIFICANT CHANGE UP (ref 15–37)
BASOPHILS # BLD AUTO: 0.02 K/UL — SIGNIFICANT CHANGE UP (ref 0–0.2)
BASOPHILS NFR BLD AUTO: 0.3 % — SIGNIFICANT CHANGE UP (ref 0–2)
BILIRUB SERPL-MCNC: 0.4 MG/DL — SIGNIFICANT CHANGE UP (ref 0.2–1.2)
BUN SERPL-MCNC: 11 MG/DL — SIGNIFICANT CHANGE UP (ref 7–23)
CALCIUM SERPL-MCNC: 9 MG/DL — SIGNIFICANT CHANGE UP (ref 8.5–10.1)
CHLORIDE SERPL-SCNC: 108 MMOL/L — SIGNIFICANT CHANGE UP (ref 96–108)
CK SERPL-CCNC: 146 U/L — SIGNIFICANT CHANGE UP (ref 26–308)
CO2 SERPL-SCNC: 30 MMOL/L — SIGNIFICANT CHANGE UP (ref 22–31)
CREAT SERPL-MCNC: 1.04 MG/DL — SIGNIFICANT CHANGE UP (ref 0.5–1.3)
EGFR: 85 ML/MIN/1.73M2 — SIGNIFICANT CHANGE UP
EOSINOPHIL # BLD AUTO: 0.26 K/UL — SIGNIFICANT CHANGE UP (ref 0–0.5)
EOSINOPHIL NFR BLD AUTO: 3.4 % — SIGNIFICANT CHANGE UP (ref 0–6)
GLUCOSE BLDC GLUCOMTR-MCNC: 86 MG/DL — SIGNIFICANT CHANGE UP (ref 70–99)
GLUCOSE SERPL-MCNC: 92 MG/DL — SIGNIFICANT CHANGE UP (ref 70–99)
HCT VFR BLD CALC: 41.1 % — SIGNIFICANT CHANGE UP (ref 39–50)
HGB BLD-MCNC: 13.2 G/DL — SIGNIFICANT CHANGE UP (ref 13–17)
IMM GRANULOCYTES NFR BLD AUTO: 0.1 % — SIGNIFICANT CHANGE UP (ref 0–0.9)
LYMPHOCYTES # BLD AUTO: 1.99 K/UL — SIGNIFICANT CHANGE UP (ref 1–3.3)
LYMPHOCYTES # BLD AUTO: 25.8 % — SIGNIFICANT CHANGE UP (ref 13–44)
MAGNESIUM SERPL-MCNC: 2 MG/DL — SIGNIFICANT CHANGE UP (ref 1.6–2.6)
MCHC RBC-ENTMCNC: 26.7 PG — LOW (ref 27–34)
MCHC RBC-ENTMCNC: 32.1 G/DL — SIGNIFICANT CHANGE UP (ref 32–36)
MCV RBC AUTO: 83 FL — SIGNIFICANT CHANGE UP (ref 80–100)
MONOCYTES # BLD AUTO: 0.6 K/UL — SIGNIFICANT CHANGE UP (ref 0–0.9)
MONOCYTES NFR BLD AUTO: 7.8 % — SIGNIFICANT CHANGE UP (ref 2–14)
NEUTROPHILS # BLD AUTO: 4.83 K/UL — SIGNIFICANT CHANGE UP (ref 1.8–7.4)
NEUTROPHILS NFR BLD AUTO: 62.6 % — SIGNIFICANT CHANGE UP (ref 43–77)
NRBC # BLD: 0 /100 WBCS — SIGNIFICANT CHANGE UP (ref 0–0)
PHOSPHATE SERPL-MCNC: 2.9 MG/DL — SIGNIFICANT CHANGE UP (ref 2.5–4.5)
PLATELET # BLD AUTO: 224 K/UL — SIGNIFICANT CHANGE UP (ref 150–400)
POTASSIUM SERPL-MCNC: 4.4 MMOL/L — SIGNIFICANT CHANGE UP (ref 3.5–5.3)
POTASSIUM SERPL-SCNC: 4.4 MMOL/L — SIGNIFICANT CHANGE UP (ref 3.5–5.3)
PROT SERPL-MCNC: 7 GM/DL — SIGNIFICANT CHANGE UP (ref 6–8.3)
RBC # BLD: 4.95 M/UL — SIGNIFICANT CHANGE UP (ref 4.2–5.8)
RBC # FLD: 14.8 % — HIGH (ref 10.3–14.5)
SODIUM SERPL-SCNC: 140 MMOL/L — SIGNIFICANT CHANGE UP (ref 135–145)
TROPONIN I, HIGH SENSITIVITY RESULT: 6.2 NG/L — SIGNIFICANT CHANGE UP
WBC # BLD: 7.71 K/UL — SIGNIFICANT CHANGE UP (ref 3.8–10.5)
WBC # FLD AUTO: 7.71 K/UL — SIGNIFICANT CHANGE UP (ref 3.8–10.5)

## 2022-09-15 PROCEDURE — 73090 X-RAY EXAM OF FOREARM: CPT | Mod: 26,50

## 2022-09-15 PROCEDURE — 99285 EMERGENCY DEPT VISIT HI MDM: CPT

## 2022-09-15 PROCEDURE — 93010 ELECTROCARDIOGRAM REPORT: CPT

## 2022-09-15 PROCEDURE — 71045 X-RAY EXAM CHEST 1 VIEW: CPT | Mod: 26

## 2022-09-15 PROCEDURE — 72125 CT NECK SPINE W/O DYE: CPT | Mod: 26,MA

## 2022-09-15 PROCEDURE — 73130 X-RAY EXAM OF HAND: CPT | Mod: 26,50

## 2022-09-15 PROCEDURE — 70450 CT HEAD/BRAIN W/O DYE: CPT | Mod: 26,MA

## 2022-09-15 RX ORDER — KETOROLAC TROMETHAMINE 30 MG/ML
15 SYRINGE (ML) INJECTION ONCE
Refills: 0 | Status: DISCONTINUED | OUTPATIENT
Start: 2022-09-15 | End: 2022-09-15

## 2022-09-15 RX ORDER — TETANUS TOXOID, REDUCED DIPHTHERIA TOXOID AND ACELLULAR PERTUSSIS VACCINE, ADSORBED 5; 2.5; 8; 8; 2.5 [IU]/.5ML; [IU]/.5ML; UG/.5ML; UG/.5ML; UG/.5ML
0.5 SUSPENSION INTRAMUSCULAR ONCE
Refills: 0 | Status: COMPLETED | OUTPATIENT
Start: 2022-09-15 | End: 2022-09-15

## 2022-09-15 RX ADMIN — TETANUS TOXOID, REDUCED DIPHTHERIA TOXOID AND ACELLULAR PERTUSSIS VACCINE, ADSORBED 0.5 MILLILITER(S): 5; 2.5; 8; 8; 2.5 SUSPENSION INTRAMUSCULAR at 18:48

## 2022-09-15 RX ADMIN — Medication 15 MILLIGRAM(S): at 19:41

## 2022-09-15 NOTE — ED PROVIDER NOTE - NSFOLLOWUPCLINICS_GEN_ALL_ED_FT
[facility]  Neurology  [address] - [address] [place] [address]  Phone: [phone]  Fax:     [facility]  Cardiology  [address] [place] [address]  Phone: [phone]  Fax:

## 2022-09-15 NOTE — ED ADULT TRIAGE NOTE - CHIEF COMPLAINT QUOTE
Patient had a fall this morning after going to the bathroom and woke up on the floor with abrasion to forehead and bridge of nose. Patient c/o arm pain that travels to B/L hands.  Patient LOC. Was sent to ED by urgent care for CT.   no pmh

## 2022-09-15 NOTE — ED PROVIDER NOTE - PROGRESS NOTE DETAILS
DWAYNE Ramirez: all results discussed with patient, patient feels well. Will dc with cardiology and neurology follow up.

## 2022-09-15 NOTE — ED PROVIDER NOTE - CARE PROVIDERS DIRECT ADDRESSES
[email or web address]

## 2022-09-15 NOTE — ED PROVIDER NOTE - NS ED ROS FT
Constitutional: (-) Fever, (-) Chills  Skin: (+)Laceration, (-) Rashes  Eyes: (-) Visual changes, (-) Discharge, (-) Redness  Ears: (-) Hearing loss, (-)Tinnitus, (-) Ear pain  Nose: (-) Nasal congestion, (-) Runny nose  Mouth/Throat: (-) Sore throat  CV: (+)Syncope, (-) Chest pain, (-) Palpitations  Resp: (-) Cough, (-) Shortness of breath, (-) Dyspnea on Exertion, (-) Wheezing  GI: (-) Abdominal pain, (-) Nausea, (-) Vomiting, (-) Diarrhea  : (-) Dysuria   MSK: (-) Myalgias  Neuro: (+) Headache, (+)LOC

## 2022-09-15 NOTE — ED ADULT NURSE REASSESSMENT NOTE - NS ED NURSE REASSESS COMMENT FT1
pt received from morning RN, IV intact right AC, vitals updated, IN no sign of distress, updated on plan of care

## 2022-09-15 NOTE — ED PROVIDER NOTE - PATIENT PORTAL LINK FT
You can access the FollowMyHealth Patient Portal offered by [facility] by registering at the following website: [email or web address]. By joining [employer]’s FollowMyHealth portal, you will also be able to view your health information using other applications (apps) compatible with our system.

## 2022-09-15 NOTE — ED PROVIDER NOTE - CLINICAL SUMMARY MEDICAL DECISION MAKING FREE TEXT BOX
52-year-old male with no significant past medical history presents emergency room for follow-up.  Syncope with head strike this morning, forehead laceration repaired with urgent care, last tetanus unknown. Reports bilateral upper extremity numbness and tingling as well as pain in bilateral hands.  Denies headache, acute change in vision, chest pain, shortness of breath, abdominal pain, nausea, vomiting, diarrhea or urinary complaints.  Patient able to ambulate without difficulty. vital signs stable, exam as noted above, will get labs, EKG, CXR, CT head/neck, reassess.

## 2022-09-15 NOTE — ED PROVIDER NOTE - OBJECTIVE STATEMENT
52-year-old male with no significant past medical history presents emergency room for follow-up.  Patient states that he has had intermittent episodes of passing out in the past, had stress test and echo with negative results.  Patient states today he woke up felt like he had to go to the bathroom, while having a bowel movement patient syncopized and wound up on the floor. States he hit his head on either the door or the floor.  Last tetanus shot unknown.  Went to urgent care had forehead laceration glued.  Came to ER for bilateral upper extremity numbness and tingling as well as pain in bilateral hands.  Denies headache, acute change in vision, chest pain, shortness of breath, abdominal pain, nausea, vomiting, diarrhea or urinary complaints.  Patient able to ambulate without difficulty.

## 2022-09-15 NOTE — ED PROVIDER NOTE - NSFOLLOWUPINSTRUCTIONS_ED_ALL_ED_FT
Today you were seen in the ER for syncope.     Please see below for a copy of your results.    Syncope    Syncope is when you temporarily lose consciousness, also called fainting or passing out. It is caused by a sudden decrease in blood flow to the brain. Even though most causes of syncope are not dangerous, syncope can possibly be a sign of a serious medical problem. Signs that you may be about to faint include feeling dizzy, lightheaded, nausea, visual changes, or cold/clammy skin. Do not drive, operate heavy machinery, or play sports until your health care provider says it is okay.    SEEK IMMEDIATE MEDICAL CARE IF YOU HAVE ANY OF THE FOLLOWING SYMPTOMS: severe headache, pain in your chest/abdomen/back, bleeding from your mouth or rectum, palpitations, shortness of breath, pain with breathing, seizure, confusion, or trouble walking.    Advance activity as tolerated.      Continue all previously prescribed medications as directed unless otherwise instructed.      Follow up with your primary care physician, neurology and caridology in 48-72 hours- bring copies of your results.

## 2022-09-15 NOTE — ED PROVIDER NOTE - ATTENDING APP SHARED VISIT CONTRIBUTION OF CARE
Patient being seen with DWAYNE Song, p/w syncopal event at home during bowel movement at 7 am today.  Felt weak prior to passing out, no chest pain, dyspnea, has had prior episodes of syncope, reports normal cardiac work up 2016, says that although he's passed out multiple times in past, he last passed out in 2016.  Denies family h/o sudden death.  EKG normal.  NIHSS 0.  [place] syncope score 0.  Pending CT head/neck.  Keeps complaining of BL arm and hand pain noted hours after he fell, neurovasc intact without any joint or muscle swelling. will obtain xrays more to reassure patient than suspected fracture.  If normal studies, ok to dc, recommend follow ups with neurology and cardiology. Patient being seen with DWAYNE Song, p/w syncopal event at home during bowel movement at 7 am today.  Felt weak prior to passing out, no chest pain, dyspnea, has had prior episodes of syncope, always during BMs followed by low blood pressure, reports normal cardiac work up 2016, says that although he's passed out multiple times in past, he last passed out in 2016.  Denies family h/o sudden death.  EKG normal.  NIHSS 0.  [place] syncope score 0.  Pending CT head/neck.  Keeps complaining of BL arm and hand pain noted hours after he fell, neurovasc intact without any joint or muscle swelling. will obtain xrays more to reassure patient than suspected fracture.  If normal studies, ok to dc, recommend follow ups with neurology and cardiology. Patient being seen with DWAYNE Song, p/w syncopal event at home during bowel movement at 7 am today.  Felt weak prior to passing out, no chest pain, dyspnea, has had prior episodes of syncope, always during BMs followed by low blood pressure, reports normal cardiac work up 2016, says that although he's passed out multiple times in past, he last passed out in 2016.  Denies family h/o sudden death.  EKG normal.  NIHSS 0.  [place] syncope score 0.  Pending CT head/neck.  No facial bony tenderness or epiastaxis.  Keeps complaining of BL arm and hand pain noted hours after he fell, neurovasc intact without any joint or muscle swelling. will obtain xrays more to reassure patient than suspected fracture.  If normal studies, ok to dc, recommend follow ups with neurology and cardiology.

## 2022-09-15 NOTE — ED PROVIDER NOTE - CONSTITUTIONAL MENTATION
PT DAILY TREATMENT NOTE     Patient Name: Sivakumar Serrano  Date:2022  : 1981  [x]  Patient  Verified  Payor: Jonelle Chan / Plan: Jason Navarrete [id number] PPO / Product Type: PPO /    In time:815 Out time:900  Total Treatment Time (min): 45  Visit #: 2 of 8    Medicare/BCBS Only   Total Timed Codes (min):  45 1:1 Treatment Time:  45       Treatment Area: Other low back pain [M54.59]    SUBJECTIVE  Pain Level (0-10 scale): 0/10  Any medication changes, allergies to medications, adverse drug reactions, diagnosis change, or new procedure performed?: [x] No    [] Yes (see summary sheet for update)  Subjective functional status/changes:   [] No changes reported  \" Trying to do more with engaging core\"    OBJECTIVE      8 min Therapeutic Exercise:  [] See flow sheet :   Rationale: increase ROM and increase strength to improve the patients ability to perform ADL's with ease. 29 min Neuromuscular Re-education:  [x]  See flow sheet :   Rationale: increase strength and increase proprioception  to improve the patients ability to stabilize spine to reduce flare ups with ADL's.    8 min Manual Therapy:  STM/MFR to Right rectus and costal mms of lower ribs   The manual therapy interventions were performed at a separate and distinct time from the therapeutic activities interventions. Rationale: increase tissue extensibility and decrease trigger points to reduce rectus pain and flare ups. With   [x] TE   [] TA   [x] neuro   [] other: Patient Education: [x] Review HEP    [] Progressed/Changed HEP based on:   [] positioning   [] body mechanics   [] transfers   [] heat/ice application    [] other:      Other Objective/Functional Measures: TVA: Poor pelvic stabilization. Pain Level (0-10 scale) post treatment: 0/10    ASSESSMENT/Changes in Function: Patient challenged with legs in straps with pelvic position no increase pain from there ex.      Patient will continue to benefit from skilled PT services to modify and progress therapeutic interventions, address functional mobility deficits, address ROM deficits, address strength deficits, analyze and address soft tissue restrictions, analyze and cue movement patterns and assess and modify postural abnormalities to attain remaining goals. [x]  See Plan of Care  []  See progress note/recertification  []  See Discharge Summary         Progress towards goals / Updated goals:              Updated Goals: to be achieved in 4-6 weeks:                         1. Improve FOTO score to predicted outcome for improved ability for daily tasks              PN: 58              2. The pt will demonstrate good plank test for 30 sec to improve ability for functional tasks              PN: Progressing Alternate knees and feet to maintain TA contraction 5 sec intervals for 15 sec.               3. The pt will report no limitation with walking several blocks              PN: Pain after walking in [place] shopping.              4.  The pt will report at least 75% improvement for improved ability for daily activities, and return to recreation.               PN: 50 % improvement              5.  Increased TA strength to 3/5 for improved spinal stabilization with functional movement.               PN:  TA 1/5 draw in only    PLAN  []  Upgrade activities as tolerated     [x]  Continue plan of care  []  Update interventions per flow sheet       []  Discharge due to:_  []  Other:_      Jeanette Baker, PT 1/31/2022  8:07 AM    Future Appointments   Date Time Provider Gardenia Hernandez   1/31/2022  8:15 AM [facility]   2/7/2022  8:15 AM [facility]   2/11/2022  9:00 AM [facility]   2/21/2022  8:15 AM [facility]   2/25/2022  9:00 [facility] awake/alert/oriented to person, place, time/situation

## 2022-09-15 NOTE — ED PROVIDER NOTE - CARE PROVIDER_API CALL
David Reis)  Clinical Neurophysiology; Neurology  [address] [facility] [facility], [address]  [place] [address]  Phone: [phone]  Fax: [phone]  Follow Up Time:     Danitza Craft)  Cardiology; Interventional Cardiology  [address] [place], [place] [id number]  Phone: [phone]  Fax: [phone]  Follow Up Time:

## 2022-09-15 NOTE — ED ADULT NURSE NOTE - OBJECTIVE STATEMENT
Patient A&Ox4 No PMH s/p fall this morning after going to the bathroom and woke up on the floor. reports LOC. pt with abrasion to forehead and bridge of nose. Patient c/o arm pain that travels to B/L hands with tingling to fingers. pt reports similar incident yearly. Was Seen at [facility] in 2016 but no diagnosis found.

## 2022-09-16 PROBLEM — N20.0 CALCULUS OF KIDNEY: Chronic | Status: ACTIVE | Noted: 2019-12-27

## 2023-07-28 NOTE — ED CDU PROVIDER NOTE - NS ED MD CDU EM INITIAL SUB
-- DO NOT REPLY / DO NOT REPLY ALL --  -- Message is from Engagement Center Operations (ECO) --    Referral Request  Name of Specialist: N/a  Provider's specialty: Hematology / Oncology    Medical condition for referral: abnormal band of protein in blood     Is this a NEW request?: yes      Referral ordered by: Lm Camp       Insurance type: See Below       Payor:  [employer] MEDICARE ADVANTAGE / Plan:  BE /[id number] / Product Type:  MEDICARE ADVANTAGE      Preferred Delivery Method   Call when ready for pickup - phone number to notify: [phone] and Mail to home (confirm address is correct in Epic demographics)    Caller Information       Type Contact Phone/Fax    07/27/2023 08:21 PM CDT Phone (Incoming) Elizabeth Antunez (Self) [phone] (H)          Alternative phone number: None    Can a detailed message be left? Yes    Please give this turnaround time to the caller:   \"This message will be sent to [state Provider's full name]. The clinical team will return your call as soon as they review your message. Typically, it takes 3 business days to process referral requests.\"   [address]125 - Obs Subsequent Comp

## 2024-05-07 NOTE — ED ADULT NURSE NOTE - NS ED NURSE DISCH DISPOSITION
"5/7/2024       RE: Roman Escalante  [address]  [facility] [address]     Dear Colleague,    Thank you for referring your patient, Roman Escalante, to the [facility] at [facility]. Please see a copy of my visit note below.    Palliative Care Progress Note    Patient Name: Roman Escalante  Primary Provider: Buddy Hart    Chief Complaint/Patient ID: Roman Escalante is a 49 year old male with PMHx of metastatic rectal cancer (mets to lung), currently enrolled in [facility] CAR-T trial (completed cell harvest 1/23/2024, tentatively planning administration of CAR-T in June).  He has been on multiple lines of systemic therapy, currently on Fruquintinib.     Last Palliative care appointment: 4/2/2024 with me.     Reviewed: Yes    ORT Score = 1 for history of alcohol abuse and his mother.     Interim History:  Roman Escalante is a 49 year old male who is seen today for follow up with Palliative Care via billable video visit.      Had planned for another steroid injection to help with pain, however was told that this would not be allowed within the [facility] study, so unfortunately he had to cancel.    States he was recently in [place] for the final set of screenings and has received approval to move forward as a part of the study.  Plan is to return in June and to be admitted there for 3 to 4 weeks.  Says it has been a huge load off to note he is approved and \"I could not be happier\".    Pain was worse over the last couple weeks, he thinks primarily due to travel.  Had been up to 3 or 4 doses of 20 mg oxycodone per day, however over the last week, this is decreased to 1 or 2 doses of 20 mg oxycodone daily.  Finds that position changes as well as eating smaller meals more often to help.  He also feels there is been a significant improvement in pain with a regular bowel regimen.  He is currently taking 2 to 3 tablets of " "Dulcolax daily and one half doses of MiraLAX.  This is working well for him.  He does not like having to take a laxative to go regularly, however he realizes that it has made a difference.    Busy week with family in town for both his birthday as well as his grandmother celebration of life.     Social History:  Has a couple of kids and a couple grandkids who lives in [place].  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 Has a cat. Has a girlfriend-she is a hospice RN.   Social History     Tobacco Use    Smoking status: Former     Current packs/day: 0.00     Average packs/day: 0.5 packs/day for 13.3 years (6.6 ttl pk-yrs)     Types: Cigarettes, Other     Start date: 2010     Quit date: 2023     Years since quittin.7     Passive exposure: Current    Smokeless tobacco: Never   Vaping Use    Vaping status: Never Used   Substance Use Topics    Alcohol use: Not Currently     Comment: social    Drug use: Yes     Types: Marijuana     Comment: Keeps up my appetite, sleep, and help with neuropathy     Advanced Care Planning: Has not completed. Girlfriend would be his HCA.     Family History- Reviewed in Epic.    Medications- Reviewed in Epic.    Past Medical History- Reviewed in [employer].    Past Surgical History- Reviewed in Epic.    Physical Exam:   Constitutional: Alert, pleasant, no apparent distress. Sitting up in chair.  Eyes: Sclera non-icteric, no eye discharge.  ENT: No nasal discharge. Ears grossly normal.  Respiratory: Unlabored respirations. Speaking in full sentences.  Musculoskeletal: Extremities appear normal- no gross deformities noted. No edema noted on upper body.   Skin: No suspicious lesions or rashes on visible skin.  Neurologic: Clear speech, no aphasia. No facial droop.  Psychiatric: Mentation appears normal, appropriate attention. Affect normal/bright. Does not appear anxious or depressed.    Key Data Reviewed:  LABS: 2024- Cr 0.83, GFR >90, Albumin 3.9, LFTs WNL.       Impression & " Recommendations & Counseling:  Roman Escalante is a 49 year old male with history of metastatic rectal cancer.     Pain - Flared with recent travel, however has now returned to only 1-2 doses of breakthrough medicine daily.  Finds position changes and a good bowel regimen also significantly improved his pain.  -Okay to continue oxycodone 10 to 20 mg every 4 hours as needed.  Currently only needing 1-2 doses of 20 mg daily.  -If pain were to worsen to the point that he was taking 3 doses of oxycodone consistently, would consider adding back in MS Contin.    Bowels - Doing much better with a regular bowel regimen.  Finds that Dulcolax and MiraLAX worked better for him, and he is not really using senna right now.  -Continue current regimen of Dulcolax 2 to 3 tablets daily and MiraLAX 1.5 doses daily.  Discussed how to adjust if stools get too loose.    Rectal cancer - Plan is moving forward for CAR-T therapy in June.      Follow up: About 2-1/2 months      Total time spent on day of encounter is 32 mins, including reviewing record, review of above studies, above visit with patient, symptomatic discussion as above, including medication adjustments/prescription management, and documentation.       Some chart documentation performed using Dragon Voice recognition Software. Although reviewed after completion, some words and grammatical errors may remain.        Again, thank you for allowing me to participate in the care of your patient.      Sincerely,    Isidra Prater, DO     Discharged

## 2024-05-10 NOTE — H&P ADULT - ATTENDING COMMENTS
No pressure injuries noted at this time.  49M with a hx of kidney stones presents to the ED for flank pain.  Found to have UTI and repeated stone with admit for nephrolithiasis, Uro eval, iv antibiotics.

## 2025-07-12 ENCOUNTER — EMERGENCY (EMERGENCY)
Facility: HOSPITAL | Age: 55
LOS: 0 days | Discharge: HOME | End: 2025-07-12
Attending: STUDENT IN AN ORGANIZED HEALTH CARE EDUCATION/TRAINING PROGRAM
Payer: COMMERCIAL

## 2025-07-12 VITALS
OXYGEN SATURATION: 99 % | HEART RATE: 57 BPM | TEMPERATURE: 99 F | HEIGHT: 66 IN | RESPIRATION RATE: 18 BRPM | SYSTOLIC BLOOD PRESSURE: 123 MMHG | WEIGHT: 143.08 LBS | DIASTOLIC BLOOD PRESSURE: 66 MMHG

## 2025-07-12 VITALS
DIASTOLIC BLOOD PRESSURE: 64 MMHG | HEART RATE: 58 BPM | RESPIRATION RATE: 18 BRPM | OXYGEN SATURATION: 98 % | SYSTOLIC BLOOD PRESSURE: 120 MMHG | TEMPERATURE: 98 F

## 2025-07-12 DIAGNOSIS — R19.7 DIARRHEA, UNSPECIFIED: ICD-10-CM

## 2025-07-12 DIAGNOSIS — R11.2 NAUSEA WITH VOMITING, UNSPECIFIED: ICD-10-CM

## 2025-07-12 DIAGNOSIS — R79.89 OTHER SPECIFIED ABNORMAL FINDINGS OF BLOOD CHEMISTRY: ICD-10-CM

## 2025-07-12 DIAGNOSIS — R10.84 GENERALIZED ABDOMINAL PAIN: ICD-10-CM

## 2025-07-12 DIAGNOSIS — R10.9 UNSPECIFIED ABDOMINAL PAIN: ICD-10-CM

## 2025-07-12 LAB
ALBUMIN SERPL ELPH-MCNC: 4 G/DL — SIGNIFICANT CHANGE UP (ref 3.3–5)
ALP SERPL-CCNC: 53 U/L — SIGNIFICANT CHANGE UP (ref 40–120)
ALT FLD-CCNC: 37 U/L — SIGNIFICANT CHANGE UP (ref 12–78)
ANION GAP SERPL CALC-SCNC: 8 MMOL/L — SIGNIFICANT CHANGE UP (ref 5–17)
AST SERPL-CCNC: 30 U/L — SIGNIFICANT CHANGE UP (ref 15–37)
BASOPHILS # BLD AUTO: 0.01 K/UL — SIGNIFICANT CHANGE UP (ref 0–0.2)
BASOPHILS NFR BLD AUTO: 0.1 % — SIGNIFICANT CHANGE UP (ref 0–2)
BILIRUB SERPL-MCNC: 0.8 MG/DL — SIGNIFICANT CHANGE UP (ref 0.2–1.2)
BUN SERPL-MCNC: 13 MG/DL — SIGNIFICANT CHANGE UP (ref 7–23)
CALCIUM SERPL-MCNC: 9.5 MG/DL — SIGNIFICANT CHANGE UP (ref 8.5–10.1)
CHLORIDE SERPL-SCNC: 106 MMOL/L — SIGNIFICANT CHANGE UP (ref 96–108)
CO2 SERPL-SCNC: 26 MMOL/L — SIGNIFICANT CHANGE UP (ref 22–31)
CREAT SERPL-MCNC: 1 MG/DL — SIGNIFICANT CHANGE UP (ref 0.5–1.3)
EGFR: 88 ML/MIN/1.73M2 — SIGNIFICANT CHANGE UP
EGFR: 88 ML/MIN/1.73M2 — SIGNIFICANT CHANGE UP
EOSINOPHIL # BLD AUTO: 0 K/UL — SIGNIFICANT CHANGE UP (ref 0–0.5)
EOSINOPHIL NFR BLD AUTO: 0 % — SIGNIFICANT CHANGE UP (ref 0–6)
GLUCOSE SERPL-MCNC: 129 MG/DL — HIGH (ref 70–99)
HCT VFR BLD CALC: 41 % — SIGNIFICANT CHANGE UP (ref 39–50)
HGB BLD-MCNC: 13.6 G/DL — SIGNIFICANT CHANGE UP (ref 13–17)
IMM GRANULOCYTES NFR BLD AUTO: 0.2 % — SIGNIFICANT CHANGE UP (ref 0–0.9)
LIDOCAIN IGE QN: 19 U/L — SIGNIFICANT CHANGE UP (ref 13–75)
LYMPHOCYTES # BLD AUTO: 0.64 K/UL — LOW (ref 1–3.3)
LYMPHOCYTES # BLD AUTO: 7.8 % — LOW (ref 13–44)
MCHC RBC-ENTMCNC: 27.1 PG — SIGNIFICANT CHANGE UP (ref 27–34)
MCHC RBC-ENTMCNC: 33.2 G/DL — SIGNIFICANT CHANGE UP (ref 32–36)
MCV RBC AUTO: 81.8 FL — SIGNIFICANT CHANGE UP (ref 80–100)
MONOCYTES # BLD AUTO: 0.28 K/UL — SIGNIFICANT CHANGE UP (ref 0–0.9)
MONOCYTES NFR BLD AUTO: 3.4 % — SIGNIFICANT CHANGE UP (ref 2–14)
NEUTROPHILS # BLD AUTO: 7.24 K/UL — SIGNIFICANT CHANGE UP (ref 1.8–7.4)
NEUTROPHILS NFR BLD AUTO: 88.5 % — HIGH (ref 43–77)
NRBC BLD AUTO-RTO: 0 /100 WBCS — SIGNIFICANT CHANGE UP (ref 0–0)
PLATELET # BLD AUTO: 217 K/UL — SIGNIFICANT CHANGE UP (ref 150–400)
POTASSIUM SERPL-MCNC: 4 MMOL/L — SIGNIFICANT CHANGE UP (ref 3.5–5.3)
POTASSIUM SERPL-SCNC: 4 MMOL/L — SIGNIFICANT CHANGE UP (ref 3.5–5.3)
PROT SERPL-MCNC: 7.8 GM/DL — SIGNIFICANT CHANGE UP (ref 6–8.3)
RBC # BLD: 5.01 M/UL — SIGNIFICANT CHANGE UP (ref 4.2–5.8)
RBC # FLD: 14.7 % — HIGH (ref 10.3–14.5)
SODIUM SERPL-SCNC: 140 MMOL/L — SIGNIFICANT CHANGE UP (ref 135–145)
WBC # BLD: 8.19 K/UL — SIGNIFICANT CHANGE UP (ref 3.8–10.5)
WBC # FLD AUTO: 8.19 K/UL — SIGNIFICANT CHANGE UP (ref 3.8–10.5)

## 2025-07-12 PROCEDURE — 99284 EMERGENCY DEPT VISIT MOD MDM: CPT

## 2025-07-12 RX ORDER — KETOROLAC TROMETHAMINE 30 MG/ML
10 INJECTION, SOLUTION INTRAMUSCULAR; INTRAVENOUS ONCE
Refills: 0 | Status: DISCONTINUED | OUTPATIENT
Start: 2025-07-12 | End: 2025-07-12

## 2025-07-12 RX ORDER — ONDANSETRON HCL/PF 4 MG/2 ML
4 VIAL (ML) INJECTION ONCE
Refills: 0 | Status: COMPLETED | OUTPATIENT
Start: 2025-07-12 | End: 2025-07-12

## 2025-07-12 RX ORDER — ONDANSETRON HCL/PF 4 MG/2 ML
1 VIAL (ML) INJECTION
Qty: 15 | Refills: 0
Start: 2025-07-12 | End: 2025-07-16

## 2025-07-12 RX ORDER — MAGNESIUM, ALUMINUM HYDROXIDE 200-200 MG
30 TABLET,CHEWABLE ORAL ONCE
Refills: 0 | Status: COMPLETED | OUTPATIENT
Start: 2025-07-12 | End: 2025-07-12

## 2025-07-12 RX ADMIN — Medication 10 MILLIGRAM(S): at 10:05

## 2025-07-12 RX ADMIN — Medication 20 MILLIGRAM(S): at 08:18

## 2025-07-12 RX ADMIN — Medication 1000 MILLILITER(S): at 08:18

## 2025-07-12 RX ADMIN — Medication 4 MILLIGRAM(S): at 08:18

## 2025-07-12 RX ADMIN — Medication 30 MILLILITER(S): at 10:05

## 2025-07-12 RX ADMIN — KETOROLAC TROMETHAMINE 10 MILLIGRAM(S): 30 INJECTION, SOLUTION INTRAMUSCULAR; INTRAVENOUS at 10:56

## 2025-07-12 RX ADMIN — Medication 1000 MILLILITER(S): at 09:14

## 2025-07-12 NOTE — ED ADULT NURSE NOTE - OBJECTIVE STATEMENT
55 yr old male Aox4. C/o generalized abdominal pain and N/V/D starting this morning. Multiple episodes of V/D. Pt believes he ate "bad gomes." Denies PMH. pt denies CP, SOB, fever, h/a, dizziness. no neuro deficits. Occasional marijuana use

## 2025-07-12 NOTE — ED PROVIDER NOTE - OBJECTIVE STATEMENT
This patient is a 55-year-old male presenting to the emergency department today for abdominal pain.  No relevant past medical history and takes no medications at home.  Here today for nausea, vomiting, and diarrhea.  States that he has had some chills as well.  He states that he ate some food last night and several hours later he started experiencing nausea, vomiting, and diarrhea.  Generalized abdominal cramping associated with this.  States that he has no chest pain.  No shortness of breath.  No headaches, lightheadedness, or dizziness.  Has no dysuria.  No other complaints at this time

## 2025-07-12 NOTE — ED ADULT TRIAGE NOTE - CHIEF COMPLAINT QUOTE
56 yo male, presents to ED c/o fever, chills, nausea, vomiting, diarrhea.  Reports gomes chicken as a possible cause. Denies blood, mucous in still. Denies medical hx or allergies.

## 2025-07-12 NOTE — ED PROVIDER NOTE - NSICDXFAMILYHX_GEN_ALL_CORE_FT
FAMILY HISTORY:  Mother  Still living? Unknown  Family history of hypertension, Age at diagnosis: Age Unknown     Left UE/Right UE

## 2025-07-12 NOTE — ED PROVIDER NOTE - NS ED ROS FT
CONSTITUTIONAL: No weight loss, fever, chills, weakness or fatigue.    HEENT:    Eyes: No visual loss, blurred vision, double vision or yellow sclerae.  Ears, Nose, Throat: No hearing loss, sneezing, congestion, runny nose or sore throat.    CARDIOVASCULAR: No chest pain, chest pressure or chest discomfort. No palpitations or edema.    RESPIRATORY: No shortness of breath, cough or sputum.    GASTROINTESTINAL: Positive for nausea, vomiting, and diarrhea.  Positive for generalized abdominal cramping.    SKIN: No rash or itching.    GENITOURINARY: Patient denies any changes to bowel or bladder function.    NEUROLOGICAL: No headache, dizziness, syncope, paralysis, ataxia, numbness or tingling in the extremities. No change in bowel or bladder control.    MUSCULOSKELETAL: No muscle, back pain, joint pain or stiffness.

## 2025-07-12 NOTE — ED PROVIDER NOTE - PATIENT PORTAL LINK FT
You can access the FollowMyHealth Patient Portal offered by [facility] by registering at the following website: [email or web address]. By joining [employer]’s FollowMyHealth portal, you will also be able to view your health information using other applications (apps) compatible with our system.

## 2025-07-12 NOTE — ED PROVIDER NOTE - PHYSICAL EXAMINATION
GENERAL: Patient appears uncomfortable.  Not in any apparent distress.    EYES: Pupils equal and reactive.  Extraocular eye movements are intact.    ENT: Head is atraumatic.  Posterior oropharynx is unremarkable.      RESPIRATORY: Lungs are clear to auscultation bilaterally.  The patient has no significant wheezing, rhonchi, or rales.    CARDIOVASCULAR: The patient has a regular rate and rhythm with no significant murmurs, rubs, or gallops.    ABDOMEN: Abdomen is soft, nondistended, and non-peritoneal.  The patient has no focal areas of tenderness.    SKIN: Skin is intact without evidence of significant lacerations or sores.    MUSCULOSKELETAL: Patient has good range of motion of all extremities.  The patient has good distal cap refill.  The patient has palpable distal pulses.  No obvious edema is noted.    NEUROLOGIC: Cranial nerves II through XII are grossly within normal limits.  Sensory and motor examination is unremarkable.    PSYCHIATRIC: Patient is awake, alert, and oriented ×4.

## 2025-07-12 NOTE — ED PROVIDER NOTE - CLINICAL SUMMARY MEDICAL DECISION MAKING FREE TEXT BOX
Patient is a 55-year-old male presenting to the emergency department today for nausea and vomiting.  CBC shows no leukocytosis.  No acute anemia.  No thrombocytopenia.  Lipase is negative.  Low suspicion for pancreatitis.  This patient's CMP shows no significant electrolyte abnormalities.  No SONYA.  Elevated LFTs.  He was given Zofran for the nausea as well as 1 L bolus of normal saline.  Given Bentyl and Maalox for the abdominal discomfort.    On reevaluation, the patient states that his discomfort is greatly improved.  His nausea is improved as well.  He is able to tolerate a p.o. challenge.  At this time, we believe the patient is safe for discharge to home with outpatient follow-up with his PCP.  He expresses understanding and is amenable to this plan.  He is well-appearing and nontoxic at this time.  Current plan is for discharge to home.  Will be discharged home with a prescription for Zofran.

## 2025-07-12 NOTE — ED ADULT NURSE NOTE - NSFALLUNIVINTERV_ED_ALL_ED
Faxed appeal letter to plan            Fax [phone]      SOCO Gilmore, [facility]  Specialty Pharmacy Clinic Liaison     [facility]    [email or web address]     Phone: [phone]  Fax: [phone]         Bed/Stretcher in lowest position, wheels locked, appropriate side rails in place/Call bell, personal items and telephone in reach/Instruct patient to call for assistance before getting out of bed/chair/stretcher/Non-slip footwear applied when patient is off stretcher/[place] to call system/Physically safe environment - no spills, clutter or unnecessary equipment/Purposeful proactive rounding/Room/bathroom lighting operational, light cord in reach